# Patient Record
Sex: MALE | Race: WHITE | NOT HISPANIC OR LATINO | Employment: FULL TIME | URBAN - METROPOLITAN AREA
[De-identification: names, ages, dates, MRNs, and addresses within clinical notes are randomized per-mention and may not be internally consistent; named-entity substitution may affect disease eponyms.]

---

## 2017-01-20 ENCOUNTER — HOSPITAL ENCOUNTER (OUTPATIENT)
Dept: BEHAVIORAL HEALTH | Facility: MEDICAL CENTER | Age: 27
End: 2017-01-20
Attending: PSYCHIATRY & NEUROLOGY
Payer: OTHER GOVERNMENT

## 2017-01-20 DIAGNOSIS — F41.9 ANXIETY: ICD-10-CM

## 2017-01-20 DIAGNOSIS — F41.0 PANIC ATTACKS: ICD-10-CM

## 2017-01-20 RX ORDER — CLONAZEPAM 1 MG/1
1 TABLET ORAL
COMMUNITY
End: 2020-08-11

## 2017-01-20 RX ORDER — FLUOXETINE HYDROCHLORIDE 20 MG/1
40 CAPSULE ORAL DAILY
COMMUNITY
End: 2020-08-11

## 2017-01-20 RX ORDER — HYDROXYZINE HYDROCHLORIDE 50 MG/ML
50 INJECTION, SOLUTION INTRAMUSCULAR EVERY 6 HOURS PRN
COMMUNITY
End: 2017-11-28

## 2017-01-20 RX ORDER — CLONAZEPAM 1 MG/1
0.5 TABLET ORAL 2 TIMES DAILY
COMMUNITY
End: 2020-08-11

## 2017-01-20 RX ORDER — TRAZODONE HYDROCHLORIDE 100 MG/1
100 TABLET ORAL NIGHTLY
COMMUNITY
End: 2017-04-17 | Stop reason: SDUPTHER

## 2017-01-20 NOTE — BH THERAPY
[facility]  INITIAL ASSESSMENT    Name: Jerman Gilmore  MRN: [id number]  : 1990  Age: 26 y.o.  Date of assessment: 2017  PCP: Kwesi Lange M.D.  Persons in attendance: Patient    CHIEF COMPLAINT AND HISTORY OF PRESENTING PROBLEM:  (as stated by Patient):  Jerman Gilmore is a 26 y.o., White male referred for assessment by No ref. provider found.  Primary presenting issue includes 26 year old male seeking treatment for anxiety and panic attacks.  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  Having difficulty sleeping and having severe anxiety.  He was discharges from and IP facility after having S/I with a plan to shoot himself.  He has no access to firearms and Denies S/I at this time. We did discuss a safety plan and He is in agreement.  Chief Complaint   Patient presents with   • Panic Attack   • Anxiety   • Depression     with suicide ideation   .    FAMILY/SOCIAL HISTORY  Current living situation/household members: Lives with his parents   Relevant family history/structure/dynamics: Parents are supportive  Current family/social stressors:  medical evaluation feels that he has not been the same since his deployment.  Quality/quantity of current family and/or social support: parents are supportive.  Does patient/parent report a family history of behavioral health issues, diagnoses, or treatment? Yes  Family History   Problem Relation Age of Onset   • Cancer Neg Hx    • Diabetes Neg Hx    • Heart Disease Neg Hx    • Hypertension Neg Hx    • Hyperlipidemia Neg Hx    • Psychiatry Sister      insomnia   • Anxiety disorder Sister    • Psychiatry Maternal Grandfather      insomnia   • Depression Maternal Grandfather    • Anxiety disorder Maternal Grandfather    • Stroke Mother    • Thyroid Mother    • Depression Father    • Suicide Attempts Paternal Grandfather         BEHAVIORAL HEALTH TREATMENT HISTORY  Does patient/parent report a history of prior behavioral health  "treatment for patient? Yes:    Dates Level of Care Facilty/Provider Diagnosis/Problem Medications   12/2016 IP [facility] Panic/anx 32 days   5/2015 IP [facility] Panic/anx 5 days   2016 X2 IP [employer] Panic/anx 7 days/9 days   [facility]/2015 [facility]  \" \"   1 day   2015 IP  [place] \" \" 2 days   2016 OP Therapist/Psychiat  \" \"  one year                                     History of untreated behavioral health issues identified? Yes    MEDICAL HISTORY  Primary care behavioral health screenings: @PHQ@   Past medical/surgical history:   Past Medical History   Diagnosis Date   • Depression    • Insomnia    • Anxiety       Past Surgical History   Procedure Laterality Date   • Dental extraction(s)          Medication Allergies:  Review of patient's allergies indicates no known allergies.     Patient reports last physical exam: 12/2016  Does patient/parent report any history of or current developmental concerns? No  Does patient/parent report nutritional concerns? No  Does patient/parent report change in appetite or weight loss/gain? No  Does patient/parent report history of eating disorder symptoms? No  Does patient/parent report dental problem? No  Does patient/parent report physical pain? No   Indicate if pain is acute or chronic, and location: na   Pain scale rating: [unfilled]   Does patient/parent report functional impact of medical, developmental, or pain issues?   N\A    EDUCATIONAL  Is patient currently enrolled in a school/educational program?   No:   Highest grade level completed: AS criminal justice [employer]  School performance/functioning: good  History of Special Education/repeated grades/learning issues: no  Preferred learning style: doing  Current learning needs (large print, language barrier, etc):  na    EMPLOYMENT/RESOURCES  Is the patient currently employed? Yes  Does the patient/parent report adequate financial resources? Yes  Does patient identify impact of presenting issue on work functioning? Yes  Work " or income-related stressors:   evaluation     HISTORY:  Does patient report current or past enlistment? Yes    [If yes, trigger below section]  Does patient report history of exposure to combat? No  Does patient report history of  sexual trauma? No  Does patient report other -related stressors? Yes  Anxiety and panic from being deployed    SPIRITUAL/CULTURAL/IDENTITY:  What are the patient’s/family’s spiritual beliefs or practices? Believes in a Higher Power  What is the patient’s cultural or ethnic background/identity? caucasion  How does the patient identify their sexual orientation? Hetersexual  How does the patient identify their gender? males  Does the patient identify any spiritual/cultural/identity factors as relevant to the presenting issue? No    LEGAL HISTORY  Has the patient ever been involved with juvenile, adult, or family legal systems? No  MIP as an adolescent.   [If yes, trigger section below:]  Does patient report ever being a victim of a crime?  No  Does patient report involvement in any current legal issues?  No  Does patient report ever being arrested or committing a crime? No  Does patient report any current agency (parole/probation/CPS/) involvement? No    ABUSE/NEGLECT/TRAUMA SCREENING  Does patient report feeling “unsafe” in his/her home, or afraid of anyone? No  Does patient report any history of physical, sexual, or emotional abuse? No  Does parent or significant other report any of the above? No  Is there evidence of neglect by self? No  Is there evidence of neglect by a caregiver? No  Does the patient/parent report any history of CPS/APS/police involvement related to suspected abuse/neglect or domestic violence? No  Does the patient/parent report any other history of potentially traumatic life events? No  Based on the information provided during the current assessment, is a mandated report of suspected abuse/neglect being made?  No     SAFETY  ASSESSMENT - SELF  Does patient acknowledge current or past symptoms of dangerousness to self? Yes has had S/I in the past but no attempt.  Did hold a gun to his head at times.  Denies S/I at this time.  Does parent/significant other report patient has current or past symptoms of dangerousness to self? No      Recent change in frequency/specificity/intensity of suicidal thoughts or self-harm behavior? Yes  Denies S/I  Current access to firearms, medications, or other identified means of suicide/self-harm? No  If yes, willing to restrict access to means of suicide/self-harm? no access  Protective factors present: Future-oriented, Hopefulness, Optimism, Spiritual beliefs/practices and Strong family connections    Current Suicide Risk: Low  Crisis Safety Plan completed and copy given to patient: Yes    SAFETY ASSESSMENT - OTHERS  Does paor past symptoms of aggressive behavior or risk to others? No  Does parent/significant othtient acknowledge current or past symptoms of aggressive behavior or risk to others? No  Does parent/significant other report patient has current or past symptoms of aggressive behavior or risk to others? No    Recent change in frequency/specificity/intensity of thoughts or threats to harm others? No  Current access to firearms/other identified means of harm? No  If yes, willing to restrict access to weapons/means of harm? na  Protective factors present: Good frustration tolerance, Fear of consequences, Moral/spiritual prohibition, Positive impulse-control, Stable relationships and Stable employment    Current Homicide Risk:  Not applicable  Crisis Safety Plan completed and copy given to patient? No  Based on information provided during the current assessment, is a mandated “duty to warn” being exercised? No    SUBSTANCE USE/ADDICTION HISTORY  [] Not applicable - patient 10 years of age or younger    Is there a family history of substance use/addiction? No  Does patient acknowledge or  "parent/significant other report use of/dependence on substances? No  Last time patient used alcohol: 2015  Within the past week? No  Last time patient used marijuana: 2007  Within the past month? No  Any other street drugs ever tried even once? Yes  Any use of prescription medications/pills without a prescription, or for reasons others than originally prescribed?  No  Any other addictive behavior reported (gambling, shopping, sex)? No     Drug History:  Amphetamine:  Amphetamine frequency: Never used      Cannibis:  Cannabis frequency: Past occasional use  Cannabis last use: 1/20/07      Cocaine:  Cocaine frequency: Past rare use  Cocaine last use: 1/20/07      Ecstasy:  Ecstasy frequency: Past rare use  Ecstacy last use: 1/20/08  Cocaine last use: 1/20/07      Hallucinogen:  Hallucinogen frequency: Past rare use  Hallucinogen last use: 4/20/07      Inhalant:   Inhalant frequency: Never used      Opiate:  Opiate frequency: Never used  Cannabis frequency: Past occasional use  Cannabis last use: 1/20/07      Other:  Other drug frequency: Never used      Sedative:   Sedative frequency: Never used          What consequences does the patient associate with any of the above substance use and or addictive behaviors? None    Patient’s motivation/readiness for change: \" I just want to continue to get better and learn more coping skills to manage anxiety.    [] Patient denies use of any substance/addictive behaviors    STRENGTHS/ASSETS  Strengths Identified by interviewer: Insight into problems, Evidence of good judgement, Family suppport, Social support, Stable relationships and Optimism  Strengths Identified by patient: \" Physical fitness, Honest, kindhearted.    MENTAL STATUS/OBSERVATIONS   Participation: Verbally monopolizing, Attentive, Engaged and Open to feedback  Grooming: Casual and Neat  Orientation:Alert and Fully Oriented   Behavior: Tense  Eye contact: Limited   Mood:Depressed and Anxious  Affect:Full range, " Expansive and Anxious  Thought process: Logical and Circumstantial  Thought content:  Obsessions  The feeling of weakness because I have to ask for help.  Speech: Hypertalkative  Perception: Within normal limits  Memory: Recent:  Poor  Insight: Limited  Judgment:  Limited  Other:    Family/couple interaction observations: na    RESULTS OF SCREENING MEASURES:  [] Not applicable  Measure:   Score:     Measure:   Score:       CLINICAL FORMULATION: 26 year male seeking treatment for anxiety and panic.  Has been having difficulty with sleep since his deployment.  Recently spent 32 days in an  program in [place] for S/I with a plan to shoot himself but has not ever made an attempt.  Has expressed increased shame about being weak and feels he is judged by his co workers in the .  Agrees to start PHP on Tues 1/24/17.      DIAGNOSTIC IMPRESSION(S):  No diagnosis found.      IDENTIFIED NEEDS/PLAN:  [If any of these marked, trigger DISPOSITION list]  Mood/anxiety  Refer to [facility]: [facility]    Does patient express agreement with the above plan? Yes     Referral appointment(s) scheduled? Yes     Marianela Davis R.N.

## 2017-01-24 ENCOUNTER — HOSPITAL ENCOUNTER (OUTPATIENT)
Dept: BEHAVIORAL HEALTH | Facility: MEDICAL CENTER | Age: 27
End: 2017-01-24
Attending: PSYCHIATRY & NEUROLOGY
Payer: OTHER GOVERNMENT

## 2017-01-24 VITALS
SYSTOLIC BLOOD PRESSURE: 124 MMHG | WEIGHT: 160 LBS | HEART RATE: 80 BPM | TEMPERATURE: 98.1 F | DIASTOLIC BLOOD PRESSURE: 76 MMHG | BODY MASS INDEX: 25.11 KG/M2 | RESPIRATION RATE: 16 BRPM | HEIGHT: 67 IN

## 2017-01-24 DIAGNOSIS — F51.01 PRIMARY INSOMNIA: ICD-10-CM

## 2017-01-24 DIAGNOSIS — F41.1 GENERALIZED ANXIETY DISORDER: ICD-10-CM

## 2017-01-24 DIAGNOSIS — F33.1 MODERATE EPISODE OF RECURRENT MAJOR DEPRESSIVE DISORDER (HCC): ICD-10-CM

## 2017-01-24 PROCEDURE — G0410 GRP PSYCH PARTIAL HOSP 45-50: HCPCS

## 2017-01-24 PROCEDURE — 99215 OFFICE O/P EST HI 40 MIN: CPT | Mod: 25

## 2017-01-24 PROCEDURE — G0176 OPPS/PHP;ACTIVITY THERAPY: HCPCS

## 2017-01-24 PROCEDURE — 99204 OFFICE O/P NEW MOD 45 MIN: CPT | Performed by: PSYCHIATRY & NEUROLOGY

## 2017-01-24 PROCEDURE — G0177 OPPS/PHP; TRAIN & EDUC SERV: HCPCS

## 2017-01-24 NOTE — BH THERAPY
Group Therapy Checklist  Attendance: Attended  Attendance Duration (min): 46-60  Number of Participants: 4  Program/Group: Partial Hospital Program  Topics Covered: Grief & Loss  Participation: Active verbal participation, Attentive  Affect/Mood Range: Flexible, Normal range  Affect/Mood Display: Congruent w/content  Cognition: Oriented, Alert  Evidence of Imminent Suicide Risk: No  Evidence of imminent homicide risk: No  Therapeutic Interventions: Emotion clarification  Progress Toward Treatment Goal: Moderate improvement

## 2017-01-24 NOTE — BH THERAPY
"Group Therapy Checklist  Attendance: Attended  Attendance Duration (min): Greater than 60  Number of Participants: 4  Program/Group: Partial Hospital Program  Topics Covered: Other (Comment): (process group)  Participation: Active verbal participation, Attentive, Supportive to other group members, Open to feedback  Affect/Mood Range: Normal range, Flexible  Affect/Mood Display: Congruent w/content  Cognition: Alert, Oriented  Evidence of Imminent Suicide Risk: No  Evidence of imminent homicide risk: No  Therapeutic Interventions: Emotion clarification, Supportive psychotherapy  Progress Toward Treatment Goal: Mild improvement  Duncaneliazar introduced himself to group; he states he is here for insomnia, anxiety and depression. He processed the use of \"wise mind\" and how knowledge of that concept has helped him cope with his irrational thoughts. Receptive to peer welcome and support.   "

## 2017-01-24 NOTE — PROGRESS NOTES
BEHAVIORAL HEALTH  INITIAL PSYCHIATRIC EVALUATION    Name: Jerman Gilmore  MRN: [id number]  : 1990  Age: 26 y.o.  Date of assessment: 2017  PCP: Kwesi Lange M.D.  Persons in attendance:Patient  Total face-to-face time: 45 minutes.    CHIEF COMPLAINT AND HISTORY OF PRESENTING PROBLEM:   (As stated by Patient)  Jerman Gilmore is a 26 y.o., White male referred for assessment by No ref. provider found. Primary presenting issue includes   Chief Complaint   Patient presents with   • Anxiety     The patient is presented today with severe anxiety and depression.   .    HISTORY OF PRESENT ILLNESS:    This is a 25 yo male, single, never , , lives with parents, seen today for evaluations. The patient was admitted to [facility] in [place] for one moth and he was discharged on Clonazepam 1 mg at night, clonazepam 0.25 in AM, prozac 40 mg per day, trazodone 100 mg at night, and hydroxyzine 50 mg as needed. The patient was given ten days prescription and he was referred to [facility]. The patient had thoughts of taking his life using a gun. 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 The patient went through insomnia for five days. The patient was sad all the time, decreased motivation, low self esteem, difficulty to focus, hopeless and helpless feelings al l the time. The patient also developed increased worries all the time, anxiety attacks with palpitation, muscle tightness, difficulty to breath. The patient reported that his insomnia continued and he has trouble getting into sleep and staying asleep. The patient also felt out body experience. The patient reported that work situations, crowd, travelling, and shift change can trigger panic attacks. The patient denied symptoms of nate, hypomania, hallucinations, and delusions.     FAMILY/SOCIAL HISTORY:  Does patient/parent report a family history of  behavioral health issues, diagnoses, or treatment? Yes   The patients paternal grand father committed suicide. The patients father is a retired  and has depression. The patients sister has anxiety and depression and she is in [employer].  The patient was born and raised in [place], [place]. The patient denied any abuse and he had a good childhood. The patient did not fit in to school and he got in to drugs and alcohol. The patient finished high[employer]ool and joined .The patient never  and has no children.     Family History   Problem Relation Age of Onset   • Cancer Neg Hx    • Diabetes Neg Hx    • Heart Disease Neg Hx    • Hypertension Neg Hx    • Hyperlipidemia Neg Hx    • Psychiatry Sister      insomnia   • Anxiety disorder Sister    • Psychiatry Maternal Grandfather      insomnia   • Depression Maternal Grandfather    • Anxiety disorder Maternal Grandfather    • Stroke Mother    • Thyroid Mother    • Depression Father    • Suicide Attempts Paternal Grandfather        Current living situation/household members: lives with parents.  Relevant family history/structure/dynamics: good support.  Quality/quantity of current family and/or social support: good.   Social History     Social History   • Marital Status: Single     Spouse Name: N/A   • Number of Children: N/A   • Years of Education: N/A     Occupational History   • Not on file.     Social History Main Topics   • Smoking status: Former Smoker -- 1.00 packs/day for 2 years     Types: Cigarettes     Quit date: 03/20/2015   • Smokeless tobacco: Never Used   • Alcohol Use: No      Comment: binge drinking on weekends   • Drug Use: No      Comment: marajuana   • Sexual Activity: No     Other Topics Concern   • Not on file     Social History Narrative    Single.     [employer].        PSYCHIATRIC TREATMENT HISTORY:  Does patient/parent report a history of outpatient psychiatric treatment for patient?   Yes:    Dates Level of Care Facilty/Provider  Diagnosis/Problem Medications   2016 [facility] Panic and depression Prozac,trazodone, and clonazepam   2015 [facility]  same 5 days.    twice IP [facility] same Seven and nine days.    [facility] same One day.                                                    Does patient/parent report a history of psychiatric hospitalizations for patient?  Yes:    Dates Facilty/Provider Diagnosis/Problem Medications Length of Stay   As above.                                                                            Does patient/parent report a history of psychotherapy or other behavioral health treatment for patient?   Yes:     Dates Facilty/Provider Diagnosis/Problem Modaility Outcome                                                                               PAST MEDICAL HISTORY:         Past Medical History   Diagnosis Date   • Depression    • Insomnia    • Anxiety        Medication Allergies  Review of patient's allergies indicates no known allergies.    Medications (non psychiatric)  Current Outpatient Prescriptions   Medication Sig Dispense Refill   • trazodone (DESYREL) 100 MG Tab Take 100 mg by mouth every evening.     • clonazepam (KLONOPIN) 1 MG Tab Take 0.5 mg by mouth 2 times a day.     • clonazepam (KLONOPIN) 1 MG Tab Take 1 mg by mouth 1 time daily as needed (at night).     • fluoxetine (PROZAC) 20 MG Cap Take 40 mg by mouth every day.     • hydrOXYzine (VISTARIL) 50 MG/ML Solution 50 mg by Intramuscular route every 6 hours as needed.     • Cholecalciferol (VITAMIN D3) 2000 UNIT CAPS Take 1 Cap by mouth every day. 90 Cap 3   • mirtazapine (REMERON) 15 MG TABS Take 1 Tab by mouth every bedtime. 30 Tab 0   • temazepam (RESTORIL) 15 MG CAPS Take 1 Cap by mouth at bedtime as needed for Sleep. 30 Cap 0     No current facility-administered medications for this encounter.       DEVELOPMENTAL HISTORY:  Delivery:not asked.  Birth weight: not asked.  Prenatal complications:  No    complications:  No   complications:  No  In utero substance exposure:  No    Reached developmental milestones within normal limits?   Gross motor:  Yes  Fine motor:  Yes   Speech:  Yes   Social interaction:  Yes    EDUCATIONAL:  Is patient currently enrolled in a school/educational program?   No:   Highest grade level completed: High school.  School performance/functioning: average.  History of Special Education/repeated grades/learning issues: no  Preferred learning style: not asked.  Current learning needs (large print, language barrier, etc):  Not asked.    Psychiatric Review of Systems:   Mood: Variable mood  Anxiety: Frequent worry, Panic symptoms/attacks and Situational anxiety  Sleep: Initial insomnia, Middle insomnia and Terminal insomnia  Psychomotor/Energy: Decreased energy/activity level  Eating/Appetite: Decreased appetite  Cognitive: Impaired concentration - chronic  Behavior: Low/Decreased goal-directed activity   Psychosis: Other: none.  Social: Avoidance of social interactions, Poor/limited social skills, Fear of rejection and Social withdrawal/isolation       Other symptoms: none.    LEGAL HISTORY:  Has the patient ever been involved with juvenile, adult, or family legal systems? No    Does patient report ever committing a crime? No   Does patient report every being arrested? No  Does patient report ever being a victim of a crime? No  Does patient report involvement in any current legal issues?No  Does patient report any current agency (parole/probation/CPS/) involvement? No    ABUSE/NEGLECT SCREENING:  Does patient report feeling “unsafe” in his/her home, or afraid of anyone? No  Does patient report any history of physical, sexual, or emotional abuse? No  Does parent or significant other report any of the above? No  Is there evidence of neglect by self?No  Is there evidence of neglect by a caregiver? No  Does the patient/parent report any history of CPS/APS/police  "involvement related to suspected abuse/neglect or domestic violence? No    Based on the information provided during the current assessment, is a mandated report of suspected abuse/neglect being made?   No    SAFETY ASSESSMENT - SELF:  Does patient acknowledge current or past symptoms of dangerousness to self? The patient put a gun to his head.    Does parent/significant other report patient has current or past symptoms of dangerousness to self? No      History of suicide by family member: Yes Grand father committed suicide.  History of suicide by friend/significant other: No    Recent change in amount/specificity/intensity of suicidal thoughts or self-harm behavior?No  Current access to firearms, medications, or other identified means of suicide/self-harm? No  If yes, willing to restrict access to means of suicide/self-harm? No  Protective factors present: Fear of suicide    Current Suicide Risk: Low  Safety Plan completed, documented in chart, and copy given to patient: Yes     Crisis Safety Plan completed and copy given to patient: No     SAFETY ASSESSMENT - OTHERS:  Does patient acknowledge current or past symptoms of aggressive behavior or risk to others? No  Does parent/significant other report patient has current or past symptoms of aggressive behavior or risk to others? No      Recent change in amount/specificity/intensity of thoughts or threats to harm others? No  Current access to firearms/other identified means of harm? No  If yes, willing to restrict access to weapons/means of harm? No    Current Homicide Risk: Low  Crisis safety Plan completed, documented in chart, and copy given to patient? No    Based on information provided during the current assessment, is a mandated \"duty to warn\" being exercised? No    SUBSTANCE USE/ADDICTION HISTORY:  [] Not applicable - patient 10 years of age or younger    Is there a family history of substance use/addiction? Yes  Does patient acknowledge or parent/significant " "other report use of/dependence on substances? No  Last time patient used alcohol: 2015  Within the past week? No  Last time patient used marijuana: 2007.  Within the past month? No  Any other street drugs ever tried even once? No  Any use of prescription medications/pills without a prescription, or for reasons others than originally prescribed?  No  Any other addictive behavior reported (gambling, shopping, sex)? No    Drug History:  Amphetamine:  Amphetamine frequency: Never used      Cannibis:  Cannabis frequency: Past occasional use  Cannabis last use: 1/20/07      Cocaine:  Cocaine frequency: Past rare use  Cocaine last use: 1/20/07      Ecstasy:  Ecstasy frequency: Past rare use  Ecstacy last use: 1/20/08  Cocaine last use: 1/20/07      Hallucinogen:  Hallucinogen frequency: Past rare use  Hallucinogen last use: 4/20/07      Inhalant:   Inhalant frequency: Never used      Opiate:  Opiate frequency: Never used  Cannabis frequency: Past occasional use  Cannabis last use: 1/20/07      Other:  Other drug frequency: Never used      Sedative:   Sedative frequency: Never used        What consequences does the patient associate with any of the above substance use and or addictive behaviors?   None    Patient’s motivation/readiness for change: yes.    [] Patient denies use of any substance/addictive behaviors    STRENGTHS/ASSETS:  Strengths Identified by interviewer: Insight into problems, Self-awareness, Social support, Optimism and Cognitive flexibility  Strengths Identified by patient: willing to change.    MENTAL STATUS EXAM/OBSERVATIONS  /76 mmHg  Pulse 80  Temp(Src) 36.7 °C (98.1 °F)  Resp 16  Ht 1.7 m (5' 6.93\")  Wt 72.576 kg (160 lb)  BMI 25.11 kg/m2  Participation: Active verbal participation, Attentive and Engaged  Grooming:  Casual  Orientation: Alert and Fully Oriented   Eye contact:  Good  Behavior: Calm   Mood:  Anxious  Affect: Flexible and Full range  Thought process: Logical and " Goal-directed  Thought content: Within normal limits  Speech: Rate within normal limits and Volume within normal limits  Perception: Within normal limits  Memory:  No gross evidence of memory deficits  Insight:  Good  Judgment: Adequate  Other:   Family/couple interaction observations: NA.    RESULTS OF SCREENING MEASURES:  [] Not applicable  Measure:   Score:     Measure:   Score:        CLINICAL FORMULATION:   This is a 27 yo male, single, in , lives with parents, recently discharged from inpatient after verbalizing suicidal thoughts, seen for evaluation. There is a family history of depression and suicide. The patients symptoms started when he was deployed to [facility] in 2015. The patient presented with depression, severe anxiety, and insomnia.          DIAGNOSTIC IMPRESSION(S):  1. Moderate episode of recurrent major depressive disorder (CMS-HCC)    2. Generalized anxiety disorder    3. Primary insomnia         IDENTIFIED NEEDS/PLAN:  [If any of these marked, trigger DISPOSITION list]  Imminent safety risk - self, Mood/anxiety, Maladaptive behavior and Occupational/Educational. The patient is admitted to [facility] and restart prozac 40 mg per day, trazodone 100 mg at night, clonazepam 1 mg at night, and clonazepam 0.25 in AM. The patient was given prescriptions today. We requested MR form the previous hospital admission.    Defer    Does patient express agreement with the above plan? Yes    Referral appointment(s) scheduled? Yes    [x] More than 50% of face-to-face time was spent in counseling and coordinating care  Discussed:   Admit to PHP.    Vineet Riojas M.D.

## 2017-01-24 NOTE — BH THERAPY
[facility]  Therapy Progress Note    Patient Name: Jerman Gilmore  Patient MRN: [id number]  Today's Date: 1/24/2017     Type of session:Individual psychotherapy  Length of session: 50  Persons in attendance:Patient    Subjective/New Info: initial individual session. Jerman states he's been in a long term treatment center in [place] this past month. He states he is taking his medication as prescribed without adverse side effects. He denies SI/ HI/ AVH. He feels stable on this medication combination as evidenced by his anxiety going from 10/10 to 5/10; he states ideally he would like to be 3/10. He identifies his chief complaint as insomnia, then anxiety, followed by depression. We discussed his plan of care; milieu routine. He is currently living with his supportive parents; they are retired.      Objective/Observations:   Participation: Active verbal participation, Attentive, Engaged and Open to feedback   Grooming: Casual and Neat   Cognition: Alert and Fully Oriented   Eye contact: Good   Mood: Anxious   Affect: Flexible and Full range   Thought process: Logical and Goal-directed   Speech: Rate within normal limits and Volume within normal limits   Other:     Diagnoses: No diagnosis found.     Current risk:   SUICIDE: Low   Homicide: Not applicable   Self-harm: Not applicable   Relapse: Not applicable   Other:    Safety Plan reviewed? Not Indicated   If evidence of imminent risk is present, intervention/plan:     Therapeutic Intervention(s): Develop/modify treatment plan, Establish rapport, Interpersonal effectiveness skills and Supportive psychotherapy    Treatment Goal(s)/Objective(s) addressed: ineffective coping     Progress toward Treatment Goals: Return to baseline    Plan:  - Continue Partial Hospital Program  - Next appointment scheduled:  Visit date not found  - Patient is in agreement with the above plan:  YES    Gwen Sneed R.N.  1/24/2017

## 2017-01-24 NOTE — BH THERAPY
Group Therapy Checklist  Attendance: Attended  Attendance Duration (min): 46-60  Number of Participants: 3  Program/Group: Partial Hospital Program  Topics Covered: Sleep Hygiene  Participation: Active verbal participation, Attentive  Affect/Mood Range: Normal range, Flexible  Affect/Mood Display: Congruent w/content  Cognition: Alert, Oriented  Evidence of Imminent Suicide Risk: No  Evidence of imminent homicide risk: No  Therapeutic Interventions: Cognitive clarification, Self-care skills  Progress Toward Treatment Goal: Mild improvement

## 2017-01-24 NOTE — CARE PLAN
Problem: Ineffective Coping  Goal: Symptom reduction and improved functioning  Intervention: Partial Hospitalization Program  Day one, PHP  Intervention: Psychiatric Evaluation and Management  First appt today @7917  Intervention: Recognizing and Replacing Self Defeating Thoughts  Assigned to review next week  Intervention: Journaling as assigned  Jerman started a journal in [place]; daily entries continue.

## 2017-01-25 ENCOUNTER — HOSPITAL ENCOUNTER (OUTPATIENT)
Dept: BEHAVIORAL HEALTH | Facility: MEDICAL CENTER | Age: 27
End: 2017-01-25
Attending: PSYCHIATRY & NEUROLOGY
Payer: OTHER GOVERNMENT

## 2017-01-25 DIAGNOSIS — F41.1 GENERALIZED ANXIETY DISORDER: ICD-10-CM

## 2017-01-25 PROCEDURE — G0411 INTER ACTIVE GRP PSYCH PARTI: HCPCS

## 2017-01-25 PROCEDURE — G0177 OPPS/PHP; TRAIN & EDUC SERV: HCPCS

## 2017-01-25 PROCEDURE — G0176 OPPS/PHP;ACTIVITY THERAPY: HCPCS

## 2017-01-25 NOTE — BH THERAPY
Group Therapy Checklist  Attendance: Attended  Attendance Duration (min): 46-60  Number of Participants: 3  Program/Group: Partial Hospital Program  Topics Covered: Other (Comment): (creative art)  Participation: Active verbal participation  Affect/Mood Range: Normal range, Flexible  Affect/Mood Display: Congruent w/content  Cognition: Alert, Oriented  Evidence of Imminent Suicide Risk: No  Evidence of imminent homicide risk: No  Therapeutic Interventions: Socialization  Progress Toward Treatment Goal: Mild improvement

## 2017-01-25 NOTE — BH THERAPY
Patient actively participated in process group.  Addressed active unresolved emotional issues. Taught some emotional skills and techniques to assist with the emotional skill building that relates to their presenting issues and active treatment plan.

## 2017-01-25 NOTE — BH THERAPY
Group Therapy Checklist  Attendance: Attended  Attendance Duration (min): 46-60  Number of Participants: 11  Program/Group: Partial Hospital Program  Topics Covered: Cognitive distortions  Participation: Active verbal participation  Affect/Mood Range: Normal range, Flexible  Affect/Mood Display: Congruent w/content  Cognition: Alert, Oriented  Evidence of Imminent Suicide Risk: No  Evidence of imminent homicide risk: No  Therapeutic Interventions: Cognitive clarification  Progress Toward Treatment Goal: Mild improvement

## 2017-01-25 NOTE — BH THERAPY
Group Therapy Checklist  Attendance: Attended  Attendance Duration (min): 46-60  Number of Participants: 3  Program/Group: Partial Hospital Program  Topics Covered: Pain vs. Suffering  Participation: Active verbal participation  Affect/Mood Range: Normal range, Flexible  Affect/Mood Display: Congruent w/content  Cognition: Alert, Oriented  Evidence of Imminent Suicide Risk: No  Evidence of imminent homicide risk: No  Therapeutic Interventions: Cognitive clarification  Progress Toward Treatment Goal: Mild improvement

## 2017-01-26 ENCOUNTER — HOSPITAL ENCOUNTER (OUTPATIENT)
Dept: BEHAVIORAL HEALTH | Facility: MEDICAL CENTER | Age: 27
End: 2017-01-26
Attending: PSYCHIATRY & NEUROLOGY
Payer: OTHER GOVERNMENT

## 2017-01-26 DIAGNOSIS — F41.0 PANIC DISORDER WITHOUT AGORAPHOBIA: ICD-10-CM

## 2017-01-26 PROCEDURE — G0176 OPPS/PHP;ACTIVITY THERAPY: HCPCS

## 2017-01-26 PROCEDURE — G0410 GRP PSYCH PARTIAL HOSP 45-50: HCPCS

## 2017-01-26 PROCEDURE — G0177 OPPS/PHP; TRAIN & EDUC SERV: HCPCS

## 2017-01-26 NOTE — BH THERAPY
Group Therapy Checklist  Attendance: Attended  Attendance Duration (min): Greater than 60  Number of Participants: 3  Program/Group: Partial Hospital Program  Topics Covered: Other (Comment): (The Shift: 2 hr)  Participation: Active verbal participation  Affect/Mood Range: Normal range, Flexible  Affect/Mood Display: Congruent w/content  Cognition: Alert, Oriented  Evidence of Imminent Suicide Risk: No  Evidence of imminent homicide risk: No  Therapeutic Interventions: Emotion clarification  Progress Toward Treatment Goal: Moderate improvement

## 2017-01-26 NOTE — BH THERAPY
Group Therapy Checklist  Attendance: Attended  Attendance Duration (min): Greater than 60  Number of Participants: 7  Program/Group: Partial Hospital Program  Topics Covered: Other (Comment): (process group)  Participation: Active verbal participation, Attentive, Supportive to other group members, Open to feedback  Affect/Mood Range: Normal range, Flexible  Affect/Mood Display: Congruent w/content  Cognition: Alert, Oriented  Evidence of Imminent Suicide Risk: No  Evidence of imminent homicide risk: No  Therapeutic Interventions: Emotion clarification, Supportive psychotherapy  Progress Toward Treatment Goal: Mild improvement  Jerman processed the improvement in his moods since he has begun therapy two months ago. He feels his anxiety is half of what it once was. Receptive to group support.

## 2017-01-26 NOTE — BH THERAPY
Group Therapy Checklist  Attendance: Attended  Attendance Duration (min): 46-60  Number of Participants: 9  Program/Group: Intensive Outpatient Program  Topics Covered: Spirituality  Participation: Active verbal participation, Verbally monopolizing  Affect/Mood Range: Normal range, Flexible  Affect/Mood Display: Congruent w/content  Cognition: Alert, Oriented  Evidence of Imminent Suicide Risk: No  Evidence of imminent homicide risk: No  Therapeutic Interventions: Self-care skills  Progress Toward Treatment Goal: Moderate improvement

## 2017-01-27 ENCOUNTER — HOSPITAL ENCOUNTER (OUTPATIENT)
Dept: BEHAVIORAL HEALTH | Facility: MEDICAL CENTER | Age: 27
End: 2017-01-27
Attending: PSYCHIATRY & NEUROLOGY
Payer: OTHER GOVERNMENT

## 2017-01-27 ENCOUNTER — TELEPHONE (OUTPATIENT)
Dept: BEHAVIORAL HEALTH | Facility: MEDICAL CENTER | Age: 27
End: 2017-01-27

## 2017-01-27 VITALS
BODY MASS INDEX: 25.11 KG/M2 | WEIGHT: 160 LBS | HEART RATE: 78 BPM | HEIGHT: 67 IN | SYSTOLIC BLOOD PRESSURE: 122 MMHG | RESPIRATION RATE: 16 BRPM | TEMPERATURE: 98.2 F | DIASTOLIC BLOOD PRESSURE: 76 MMHG

## 2017-01-27 DIAGNOSIS — F33.1 MODERATE EPISODE OF RECURRENT MAJOR DEPRESSIVE DISORDER (HCC): ICD-10-CM

## 2017-01-27 DIAGNOSIS — F51.01 PRIMARY INSOMNIA: ICD-10-CM

## 2017-01-27 DIAGNOSIS — F41.1 GENERALIZED ANXIETY DISORDER: ICD-10-CM

## 2017-01-27 PROCEDURE — G0176 OPPS/PHP;ACTIVITY THERAPY: HCPCS

## 2017-01-27 PROCEDURE — G0410 GRP PSYCH PARTIAL HOSP 45-50: HCPCS

## 2017-01-27 PROCEDURE — 99215 OFFICE O/P EST HI 40 MIN: CPT | Mod: 25

## 2017-01-27 PROCEDURE — 99214 OFFICE O/P EST MOD 30 MIN: CPT | Performed by: PSYCHIATRY & NEUROLOGY

## 2017-01-27 PROCEDURE — G0177 OPPS/PHP; TRAIN & EDUC SERV: HCPCS

## 2017-01-27 NOTE — BH THERAPY
Group Therapy Checklist  Attendance: Attended  Attendance Duration (min): 46-60  Number of Participants: 2  Program/Group: Partial Hospital Program  Topics Covered: Other (Comment): (creative art)  Participation: Active verbal participation  Affect/Mood Range: Normal range, Flexible  Affect/Mood Display: Congruent w/content  Cognition: Alert, Oriented  Evidence of Imminent Suicide Risk: No  Evidence of imminent homicide risk: No  Therapeutic Interventions: Socialization, Leisure and Recreation skills  Progress Toward Treatment Goal: Moderate improvement

## 2017-01-27 NOTE — TELEPHONE ENCOUNTER
[facility]    Treatment Team Staffing    Patient Name: Jerman Gilmore Program: PHP Date: 1/27/2017     Attendees: Marianela Davis, BRANT; Gwen Sneed, RN and PANDA Riojas MD      Patient's Progress toward Goals Listed on the Treatment Plan: adjusting to PHP routine; routine identified as helpful to reduce anxiety    1. Client's Participation When in Attendance Was: Active in a Positive Way    2. Counselor's Evaluation of Client's Progress: Positive Movement    3. Patient is attending group and individual sessions and is progressing well toward the treatment goals: yes      YES NO   A. Relapse During Program []  [x]    B. Requires physician review []  [x]    C. Referral to program inappropriate []  [x]    D. Non compliance with Treatment Plan []  [x]    E. Early treatment termination (lack of attendance) []  [x]      []      Comments: feeling sad today, girl he has been texting did not respond so no Saturday night date.     Treatment Plan Review: - Continue [facility] Program  - Next appointment scheduled:  1/30/2017  - Patient is in agreement with the above plan:  YES

## 2017-01-27 NOTE — PROGRESS NOTES
"[facility]  PSYCHIATRIC FOLLOW-UP NOTE    Name: Jerman Gilmore  MRN: [id number]  : 1990  Age: 26 y.o.  Date of assessment: 2017  PCP: Kwesi Lange M.D.  Persons in attendance: Patient    REASON FOR VISIT/CHIEF COMPLAINT (as stated by Patient):  Jerman Gilmore is a 26 y.o., White male, attending follow-up appointment for   Chief Complaint   Patient presents with   • Other     Depression, anxiety, and insomnia with suicidall thoughts   .      HISTORY OF PRESENT ILLNESS:    The patient is seen today for follow up on his mood disorder. The patient reported that his sleep improved on clonazepam and he is taking trazodone with that. The patient talked about his guilt that he could not continue with . The patient felt like he let his family down. The patients dad is retired ..     PSYCHOSOCIAL CHANGES SINCE PREVIOUS CONTACT:  Sleep improved and mood is five in O to10 scale.    RESPONSE TO TREATMENT:  Fair.    MEDICATION SIDE EFFECTS:  Denied.    REVIEW OF SYSTEMS:        Constitutional negative   Eyes negative   Ears/Nose/Mouth/Throat negative   Cardiovascular negative   Respiratory negative   Gastrointestinal negative   Genitourinary negative   Muscular negative   Integumentary negative   Neurological negative   Endocrine negative   Hematologic/Lymphatic negative       PSYCHIATRIC EXAMINATION/MENTAL STATUS  /76 mmHg  Pulse 78  Temp(Src) 36.8 °C (98.2 °F)  Resp 16  Ht 1.7 m (5' 6.93\")  Wt 72.576 kg (160 lb)  BMI 25.11 kg/m2  Participation: Active verbal participation, Attentive and Engaged  Grooming:Casual  Orientation: Alert and Fully Oriented  Eye contact: Good  Behavior:Calm   Mood: Depressed and Anxious  Affect: Flexible and Full range  Thought process: Logical and Goal-directed  Thought content:  Within normal limits  Speech: Rate within normal limits and Volume within normal limits  Perception:  Within normal limits  Memory:  No gross evidence of memory " deficits  Insight: Good  Judgment: Adequate  Family/couple interaction observations:   Other:    Current risk:    Suicide: Low   Homicide: Low   Self-harm: Low  Relapse: Low  Other:   Crisis Safety Plan reviewed?Yes  If evidence of imminent risk is present, intervention/plan:    Medical Records/Labs/Diagnostic Tests Reviewed: yes.    Medical Records/Labs/Diagnostic Tests Ordered: none.    DIAGNOSTIC IMPRESSION(S):  1. Generalized anxiety disorder    2. Moderate episode of recurrent major depressive disorder (CMS-HCC)    3. Primary insomnia           ASSESSMENT AND PLAN:  Continue   Fluoxetine 40 mg per day  Trazodone 100 mg at night  Clonazepam 1 mg at night and clonazepam 0.25 mg in AM  Follow up in PHP.    More than 50% of face-to-face time in this 30 minute visit was spent in psychotherapy/counseling.    Topics addressed include:    Vineet Riojas M.D.

## 2017-01-27 NOTE — BH THERAPY
" [facility]  Therapy Progress Note    Patient Name: Jerman Gilmore  Patient MRN: [id number]  Today's Date: 2017     Type of session:Individual psychotherapy  Length of session: 45  Persons in attendance:Patient    Subjective/New Info: individual session. Processed leisure and spiritual assessments. Jerman has indicated that codependency is often a problem for him. 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 His  grandfather  recently and Jerman feels he let him down. He longs for connection with a significant other and connection with a career passion.     Objective/Observations:   Participation: Active verbal participation, Attentive, Engaged and Open to feedback   Grooming: Casual and Neat   Cognition: Alert and Fully Oriented   Eye contact: Good   Mood: Anxious   Affect: Flexible and Full range   Thought process: Logical and Goal-directed   Speech: Rate within normal limits and Volume within normal limits   Other:     Diagnoses: No diagnosis found.     Current risk:   SUICIDE: Low   Homicide: Not applicable   Self-harm: Not applicable   Relapse: Not applicable   Other:    Safety Plan reviewed? Not Indicated   If evidence of imminent risk is present, intervention/plan:     Therapeutic Intervention(s): Distress tolerance skills, Interpersonal effectiveness skills, Leisure and recreation skills, Review treatment plan and Supportive psychotherapy    Treatment Goal(s)/Objective(s) addressed: ineffective coping     Progress toward Treatment Goals: Mild improvement    Plan:  - Continue Partial Hospital Program  - \"Homework\" recommendation: journal three things to be grateful for each evening  - Next appointment scheduled:  2017  - Patient is in agreement with the above plan:  YES    Gwen Sneed R.N.  2017                                     "

## 2017-01-27 NOTE — BH THERAPY
Group Therapy Checklist  Attendance: Attended  Attendance Duration (min): 46-60  Number of Participants: 2  Program/Group: Partial Hospital Program  Topics Covered: Other (Comment): (Flow: the neuroscience of mindfulness)  Participation: Active verbal participation  Affect/Mood Range: Normal range, Flexible  Affect/Mood Display: Congruent w/content  Cognition: Alert, Oriented  Evidence of Imminent Suicide Risk: No  Evidence of imminent homicide risk: No  Therapeutic Interventions: Cognitive clarification, Distress tolerance skills  Progress Toward Treatment Goal: Moderate improvement

## 2017-01-27 NOTE — BH THERAPY
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  Expressed some sadness of letting go of a dream or idea of who he wanted to be.  Receptive to support from peers.)  Affect/Mood Range: Flexible, Normal range  Affect/Mood Display: Congruent w/content  Evidence of Imminent Suicide Risk: No  Evidence of imminent homicide risk: No  Therapeutic Interventions: Emotion clarification  Progress Toward Treatment Goal: Moderate improvement

## 2017-01-27 NOTE — BH THERAPY
Group Therapy Checklist  Attendance: Attended  Attendance Duration (min): 46-60  Number of Participants: 7  Program/Group: Partial Hospital Program  Topics Covered: ACT conept intro  Participation: Active verbal participation, Attentive  Affect/Mood Range: Normal range, Flexible  Affect/Mood Display: Congruent w/content  Cognition: Alert, Oriented  Evidence of Imminent Suicide Risk: No  Evidence of imminent homicide risk: No  Therapeutic Interventions: Cognitive clarification, Values clarification, Mindfulness exercise  Progress Toward Treatment Goal: Mild improvement

## 2017-01-30 ENCOUNTER — HOSPITAL ENCOUNTER (OUTPATIENT)
Dept: BEHAVIORAL HEALTH | Facility: MEDICAL CENTER | Age: 27
End: 2017-01-30
Attending: PSYCHIATRY & NEUROLOGY
Payer: OTHER GOVERNMENT

## 2017-01-30 DIAGNOSIS — F43.10 POSTTRAUMATIC STRESS DISORDER: ICD-10-CM

## 2017-01-30 PROCEDURE — G0176 OPPS/PHP;ACTIVITY THERAPY: HCPCS

## 2017-01-30 PROCEDURE — G0177 OPPS/PHP; TRAIN & EDUC SERV: HCPCS

## 2017-01-30 PROCEDURE — G0411 INTER ACTIVE GRP PSYCH PARTI: HCPCS

## 2017-01-30 NOTE — BH THERAPY
Group Therapy Checklist  Attendance: Attended  Attendance Duration (min): 46-60  Number of Participants: 3  Program/Group: Partial Hospital Program  Topics Covered: [place] kindness  Participation: Active verbal participation, Supportive to other group members, Attentive, Open to feedback  Affect/Mood Range: Normal range, Flexible  Affect/Mood Display: Congruent w/content  Cognition: Alert, Oriented  Evidence of Imminent Suicide Risk: No  Evidence of imminent homicide risk: No  Therapeutic Interventions: Emotion clarification, Self-care skills, Mindfulness exercise  Progress Toward Treatment Goal: Moderate improvement.  He shared some of the skills he has learned in treatment and was supportive of other group members.

## 2017-01-30 NOTE — BH THERAPY
Group Therapy Checklist  Attendance: Attended  Attendance Duration (min): 46-60  Number of Participants: 9  Program/Group: Partial Hospital Program  Topics Covered: Assertiveness  Participation: Active verbal participation  Affect/Mood Range: Normal range, Flexible  Affect/Mood Display: Congruent w/content  Cognition: Alert, Oriented  Evidence of Imminent Suicide Risk: No  Evidence of imminent homicide risk: No  Therapeutic Interventions: Communication skills, Cognitive clarification  Progress Toward Treatment Goal: Mild improvement

## 2017-01-31 ENCOUNTER — HOSPITAL ENCOUNTER (OUTPATIENT)
Dept: BEHAVIORAL HEALTH | Facility: MEDICAL CENTER | Age: 27
End: 2017-01-31
Attending: PSYCHIATRY & NEUROLOGY
Payer: OTHER GOVERNMENT

## 2017-01-31 VITALS
SYSTOLIC BLOOD PRESSURE: 126 MMHG | RESPIRATION RATE: 14 BRPM | DIASTOLIC BLOOD PRESSURE: 80 MMHG | TEMPERATURE: 98.4 F | BODY MASS INDEX: 25.11 KG/M2 | HEART RATE: 88 BPM | WEIGHT: 160 LBS

## 2017-01-31 DIAGNOSIS — F41.0 PANIC DISORDER WITHOUT AGORAPHOBIA WITH SEVERE PANIC ATTACKS: ICD-10-CM

## 2017-01-31 DIAGNOSIS — F33.1 MODERATE EPISODE OF RECURRENT MAJOR DEPRESSIVE DISORDER (HCC): ICD-10-CM

## 2017-01-31 PROCEDURE — G0410 GRP PSYCH PARTIAL HOSP 45-50: HCPCS

## 2017-01-31 PROCEDURE — G0177 OPPS/PHP; TRAIN & EDUC SERV: HCPCS

## 2017-01-31 PROCEDURE — 99213 OFFICE O/P EST LOW 20 MIN: CPT | Performed by: PSYCHIATRY & NEUROLOGY

## 2017-01-31 PROCEDURE — G0176 OPPS/PHP;ACTIVITY THERAPY: HCPCS

## 2017-01-31 PROCEDURE — 99215 OFFICE O/P EST HI 40 MIN: CPT | Mod: 25

## 2017-01-31 NOTE — BH THERAPY
Group Therapy Checklist  Attendance: Attended  Attendance Duration (min): 46-60  Number of Participants: 5  Program/Group: Partial Hospital Program  Topics Covered: Detachment  Participation: Active verbal participation, Verbally monopolizing, Attentive  Affect/Mood Range: Normal range, Flexible  Affect/Mood Display: Congruent w/content  Cognition: Alert, Oriented  Evidence of Imminent Suicide Risk: No  Evidence of imminent homicide risk: No  Therapeutic Interventions: Emotion clarification  Progress Toward Treatment Goal: Moderate improvement

## 2017-01-31 NOTE — BH THERAPY
[facility]  Therapy Progress Note    Patient Name: Jerman Gilmore  Patient MRN: [id number]  Today's Date: 1/31/2017     Type of session:Individual psychotherapy  Length of session: 45  Persons in attendance:Patient    Subjective/New Info: individual session. Message for Chief Pulido at [phone] re: treatment plan (GUILHERME in chart). Processed fears regarding returning to work and how work contacting him triggered increase in anxiety. Discussed strategies for self care, grounding especially using socratic feedback.     Objective/Observations:   Participation: Active verbal participation, Attentive, Engaged and Open to feedback   Grooming: Casual and Neat   Cognition: Alert and Fully Oriented   Eye contact: Limited   Mood: Depressed and Anxious   Affect: Flexible and Full range   Thought process: Logical and Goal-directed   Speech: Rate within normal limits and Volume within normal limits   Other:     Diagnoses:   1. Panic disorder without agoraphobia with severe panic attacks         Current risk:   SUICIDE: Low   Homicide: Not applicable   Self-harm: Not applicable   Relapse: Not applicable   Other:    Safety Plan reviewed? Not Indicated   If evidence of imminent risk is present, intervention/plan:     Therapeutic Intervention(s): Distress tolerance skills, Interpersonal effectiveness skills, Review treatment plan, Self-care skills and Supportive psychotherapy    Treatment Goal(s)/Objective(s) addressed: ineffective coping     Progress toward Treatment Goals: Mild improvement    Plan:  - Continue Partial Hospital Program  - Patient is in agreement with the above plan:  YES    Gwen Sneed R.N.  1/31/2017

## 2017-01-31 NOTE — CARE PLAN
Problem: Ineffective Coping  Goal: Symptom reduction and improved functioning  Intervention: Relapse Prevention Plan  PC to Chief Pulido with f/u email [email or web address] re: treatment plan   Will talk to Chief Pulido in one month.   Intervention: Journaling as assigned  Processed recent journal entries including art work - three headed snake: anxiety, depression and insomnia

## 2017-01-31 NOTE — PROGRESS NOTES
[facility]  PSYCHIATRIC FOLLOW-UP NOTE    Name: Jerman Gilmore  MRN: [id number]  : 1990  Age: 26 y.o.  Date of assessment: 2017  PCP: Kwesi Lange M.D.  Persons in attendance: Patient    REASON FOR VISIT/CHIEF COMPLAINT (as stated by Patient):  Jerman Gilmore is a 26 y.o., White male, attending follow-up appointment for   Chief Complaint   Patient presents with   • Anxiety     The patient is seen today for follow up on his depression and anxiety.   .      HISTORY OF PRESENT ILLNESS:    The patient is seen today for follow up and he is very anxious today because he had a call from his chief. The patients thoughts of going back there made him very nervous and anxious. The patient is taking his medications as prescribed. The patient reported that he woke four times last night but was able to go back to sleep.    PSYCHOSOCIAL CHANGES SINCE PREVIOUS CONTACT:  Anxious and nervous today.     RESPONSE TO TREATMENT:  Fair.    MEDICATION SIDE EFFECTS:  Denied.    REVIEW OF SYSTEMS:        Constitutional negative   Eyes negative   Ears/Nose/Mouth/Throat negative   Cardiovascular negative   Respiratory negative   Gastrointestinal negative   Genitourinary negative   Muscular negative   Integumentary negative   Neurological negative   Endocrine negative   Hematologic/Lymphatic negative       PSYCHIATRIC EXAMINATION/MENTAL STATUS  /80 mmHg  Pulse 88  Temp(Src) 36.9 °C (98.4 °F)  Resp 14  Wt 72.576 kg (160 lb)  Participation: Active verbal participation and Attentive  Grooming:Casual  Orientation: Alert and Fully Oriented  Eye contact: Limited  Behavior:Tense and Hypoactive   Mood: Anxious  Affect: Blunted  Thought process: Logical and Goal-directed  Thought content:  Within normal limits  Speech: Rate within normal limits and Volume within normal limits  Perception:  Within normal limits  Memory:  No gross evidence of memory deficits  Insight: Good  Judgment: Good  Family/couple interaction  observations:   Other:    Current risk:    Suicide: Low   Homicide: Low   Self-harm: Low  Relapse: Low  Other:   Crisis Safety Plan reviewed?Yes  If evidence of imminent risk is present, intervention/plan:    Medical Records/Labs/Diagnostic Tests Reviewed: yes.    Medical Records/Labs/Diagnostic Tests Ordered: none.    DIAGNOSTIC IMPRESSION(S):  1. Panic disorder without agoraphobia with severe panic attacks    2. Moderate episode of recurrent major depressive disorder (CMS-[facility])           ASSESSMENT AND PLAN:  Major depression  Panic disorder  Situational anxiety    Continue  Prozac 40 mg per day  Trazodone 100 mg at night  Clonazepam 1 mg at night  Clonazepam 0.25 mg in AM  Follow up in PHP.      More than 50% of face-to-face time in this 30 minute visit was spent in psychotherapy/counseling.    Topics addressed include:  Continue PHP  And other activities.    Vineet Riojas M.D.

## 2017-01-31 NOTE — BH THERAPY
Group Therapy Checklist  Attendance: Attended  Attendance Duration (min): Greater than 60  Number of Participants: 2  Program/Group: Partial Hospital Program  Topics Covered: Other (Comment): (the Shift: 2 hours)  Participation: Attentive  Affect/Mood Range: Normal range, Flexible  Affect/Mood Display: Congruent w/content  Cognition: Alert, Oriented  Evidence of Imminent Suicide Risk: No  Evidence of imminent homicide risk: No  Therapeutic Interventions: Values clarification, Mindfulness exercise  Progress Toward Treatment Goal: Mild improvement

## 2017-01-31 NOTE — BH THERAPY
Group Therapy Checklist  Attendance: Attended  Attendance Duration (min): Greater than 60  Number of Participants: 5  Program/Group: Partial Hospital Program  Topics Covered: Other (Comment): (process group)  Participation: Active verbal participation, Attentive, Supportive to other group members, Open to feedback  Affect/Mood Range: Normal range, Flexible  Affect/Mood Display: Congruent w/content  Cognition: Alert, Oriented  Evidence of Imminent Suicide Risk: No  Evidence of imminent homicide risk: No  Therapeutic Interventions: Emotion clarification, Supportive psychotherapy  Progress Toward Treatment Goal: Mild improvement  Jerman processed his fears regarding going by his work place today. He explored the best and worst thing that could happen. He hasn't seen co-workers in a couple of months and although he intellectually knows it will be okay, he is feeling the anxiety of the unknown. Receptive to peer support.

## 2017-02-01 ENCOUNTER — HOSPITAL ENCOUNTER (OUTPATIENT)
Dept: BEHAVIORAL HEALTH | Facility: MEDICAL CENTER | Age: 27
End: 2017-02-01
Attending: PSYCHIATRY & NEUROLOGY
Payer: OTHER GOVERNMENT

## 2017-02-01 DIAGNOSIS — F41.0 PANIC DISORDER WITHOUT AGORAPHOBIA WITH MODERATE PANIC ATTACKS: ICD-10-CM

## 2017-02-01 PROCEDURE — G0177 OPPS/PHP; TRAIN & EDUC SERV: HCPCS

## 2017-02-01 PROCEDURE — G0410 GRP PSYCH PARTIAL HOSP 45-50: HCPCS

## 2017-02-01 PROCEDURE — G0176 OPPS/PHP;ACTIVITY THERAPY: HCPCS

## 2017-02-01 NOTE — BH THERAPY
"Group Therapy Checklist  Attendance: Attended  Attendance Duration (min): Greater than 60  Number of Participants: 10  Program/Group: Partial Hospital Program  Topics Covered: Other (Comment): (process group)  Participation: Active verbal participation, Attentive, Supportive to other group members, Open to feedback  Affect/Mood Range: Normal range, Flexible  Affect/Mood Display: Congruent w/content  Cognition: Alert, Oriented  Evidence of Imminent Suicide Risk: No  Evidence of imminent homicide risk: No  Therapeutic Interventions: Emotion clarification, Supportive psychotherapy  Progress Toward Treatment Goal: Moderate improvement  Jerman processed his visit to his workplace yesterday, describing the overwhelming anxiety and his use of therapy tools to walk through the discomfort rather than \"take a pill\". He felt successful and encouraged by his co-workers who he felt were supportive and glad to see him. Receptive to peer support.   "

## 2017-02-01 NOTE — BH THERAPY
Group Therapy Checklist  Attendance: Attended  Attendance Duration (min): 46-60  Number of Participants: 10  Program/Group: Partial Hospital Program  Topics Covered: Chalk talk  Participation: Attentive  Affect/Mood Range: Normal range, Flexible  Affect/Mood Display: Congruent w/content  Cognition: Alert, Oriented  Evidence of Imminent Suicide Risk: No  Evidence of imminent homicide risk: No  Therapeutic Interventions: Relapse prevention  Progress Toward Treatment Goal: Mild improvement

## 2017-02-01 NOTE — BH THERAPY
Group Therapy Checklist  Attendance: Attended  Attendance Duration (min): 46-60  Number of Participants: 3  Program/Group: Partial Hospital Program  Topics Covered: Other (Comment): (interactive: playing basketball)  Participation: Active verbal participation  Affect/Mood Range: Normal range, Flexible  Affect/Mood Display: Congruent w/content  Cognition: Alert, Oriented  Evidence of Imminent Suicide Risk: No  Evidence of imminent homicide risk: No  Therapeutic Interventions: Leisure and Recreation skills, Play therapy, Socialization  Progress Toward Treatment Goal: Moderate improvement

## 2017-02-02 ENCOUNTER — HOSPITAL ENCOUNTER (OUTPATIENT)
Dept: BEHAVIORAL HEALTH | Facility: MEDICAL CENTER | Age: 27
End: 2017-02-02
Attending: PSYCHIATRY & NEUROLOGY
Payer: OTHER GOVERNMENT

## 2017-02-02 DIAGNOSIS — F41.0 PANIC DISORDER WITHOUT AGORAPHOBIA WITH MODERATE PANIC ATTACKS: ICD-10-CM

## 2017-02-02 PROCEDURE — G0176 OPPS/PHP;ACTIVITY THERAPY: HCPCS

## 2017-02-02 PROCEDURE — G0411 INTER ACTIVE GRP PSYCH PARTI: HCPCS

## 2017-02-02 PROCEDURE — G0177 OPPS/PHP; TRAIN & EDUC SERV: HCPCS

## 2017-02-02 NOTE — BH THERAPY
Group Therapy Checklist  Attendance: Attended  Attendance Duration (min): 46-60  Number of Participants: 3  Program/Group: Partial Hospital Program  Topics Covered: Mindful practice  Participation: Active verbal participation, Attentive  Affect/Mood Range: Normal range, Flexible  Affect/Mood Display: Congruent w/content  Cognition: Alert, Oriented  Evidence of Imminent Suicide Risk: No  Evidence of imminent homicide risk: No  Therapeutic Interventions: Mindfulness exercise  Progress Toward Treatment Goal: Moderate improvement

## 2017-02-03 ENCOUNTER — HOSPITAL ENCOUNTER (OUTPATIENT)
Dept: BEHAVIORAL HEALTH | Facility: MEDICAL CENTER | Age: 27
End: 2017-02-03
Attending: PSYCHIATRY & NEUROLOGY
Payer: OTHER GOVERNMENT

## 2017-02-03 DIAGNOSIS — F41.0 PANIC DISORDER WITHOUT AGORAPHOBIA: ICD-10-CM

## 2017-02-03 PROCEDURE — G0176 OPPS/PHP;ACTIVITY THERAPY: HCPCS

## 2017-02-03 PROCEDURE — G0410 GRP PSYCH PARTIAL HOSP 45-50: HCPCS

## 2017-02-03 PROCEDURE — G0177 OPPS/PHP; TRAIN & EDUC SERV: HCPCS

## 2017-02-03 NOTE — BH THERAPY
Group Therapy Checklist  Attendance: Attended  Attendance Duration (min): Greater than 60  Number of Participants: 8  Program/Group: Partial Hospital Program  Topics Covered: Other (Comment): (Process Group)  Participation: Active verbal participation, Supportive to other group members, Attentive (Talked about benefit of medication for sleep; irritated about phone.) Aware he can choose how much power to give external events.  Affect/Mood Range: Normal range, Flexible  Affect/Mood Display: Congruent w/content, Other (Comment) (Irritated re: external events)  Cognition: Alert, Oriented  Evidence of Imminent Suicide Risk: No  Evidence of imminent homicide risk: No  Therapeutic Interventions: Cognitive modification, Emotion clarification, Supportive psychotherapy  Progress Toward Treatment Goal: Moderate improvement

## 2017-02-03 NOTE — BH THERAPY
" [facility]  Therapy Progress Note    Patient Name: Jerman Gilmore  Patient MRN: [id number]  Today's Date: 2/3/2017     Type of session:Individual psychotherapy  Length of session: 45  Persons in attendance:Patient    Subjective/New Info: individual session. Jerman processed feeling frustrated the past couple of days. He identified that social media jabs were triggering anger. He saw how his progress could be derailed by emotional upset. As he processed, he recalled past hurts and stuffed feelings. As he sorted through his tools, he identified strategies that took him through to amends to his parents for upset. Handling things differently he felt better.     Objective/Observations:   Participation: Active verbal participation, Attentive, Engaged and Open to feedback   Grooming: Casual and Neat   Cognition: Alert and Fully Oriented   Eye contact: Good   Mood: Anxious   Affect: Flexible and Full range   Thought process: Goal-directed   Speech: Rate within normal limits and Volume within normal limits   Other:     Diagnoses:   1. Panic disorder without agoraphobia         Current risk:   SUICIDE: Low   Homicide: Not applicable   Self-harm: Not applicable   Relapse: Not applicable   Other:    Safety Plan reviewed? Not Indicated   If evidence of imminent risk is present, intervention/plan:     Therapeutic Intervention(s): Communication skills, Distress tolerance skills, Interpersonal effectiveness skills, Positive behavior reinforced, Review treatment plan and Supportive psychotherapy    Treatment Goal(s)/Objective(s) addressed: ineffective coping     Progress toward Treatment Goals: Moderate improvement    Plan:  - Continue Intensive Outpatient Program  - \"Homework\" recommendation: anxiety worksheets  - Patient is in agreement with the above plan:  YES    Gwen Sneed R.N.  2/3/2017                                     "

## 2017-02-03 NOTE — BH THERAPY
Group Therapy Checklist  Attendance: Attended  Attendance Duration (min): 46-60  Number of Participants: 2  Program/Group: Partial Hospital Program  Topics Covered: Other (Comment): (creative art)  Participation: Active verbal participation, Attentive  Affect/Mood Range: Normal range, Flexible  Affect/Mood Display: Congruent w/content  Cognition: Alert, Oriented  Evidence of Imminent Suicide Risk: No  Evidence of imminent homicide risk: No  Therapeutic Interventions: Socialization  Progress Toward Treatment Goal: Moderate improvement

## 2017-02-03 NOTE — BH THERAPY
Group Therapy Checklist  Attendance: Attended  Attendance Duration (min): 46-60  Number of Participants: 3  Program/Group: Partial Hospital Program  Topics Covered:  (activity pictionary)  Participation: Active verbal participation, Attentive, Supportive to other group members  Affect/Mood Range: Normal range, Flexible  Affect/Mood Display: Congruent w/content  Cognition: Alert, Oriented  Evidence of Imminent Suicide Risk: No  Evidence of imminent homicide risk: No  Therapeutic Interventions: Socialization  Progress Toward Treatment Goal: Moderate improvement

## 2017-02-03 NOTE — BH THERAPY
Group Therapy Checklist  Attendance: Attended  Attendance Duration (min): 46-60  Number of Participants: 2  Program/Group: Partial Hospital Program  Topics Covered: Other (Comment): (courage)  Participation: Active verbal participation, Attentive  Affect/Mood Range: Normal range, Flexible  Affect/Mood Display: Congruent w/content  Cognition: Alert, Oriented  Evidence of Imminent Suicide Risk: No  Evidence of imminent homicide risk: No  Therapeutic Interventions: Values clarification  Progress Toward Treatment Goal: Moderate improvement

## 2017-02-06 ENCOUNTER — HOSPITAL ENCOUNTER (OUTPATIENT)
Dept: BEHAVIORAL HEALTH | Facility: MEDICAL CENTER | Age: 27
End: 2017-02-06
Attending: PSYCHIATRY & NEUROLOGY
Payer: OTHER GOVERNMENT

## 2017-02-06 DIAGNOSIS — F33.40 RECURRENT MAJOR DEPRESSIVE DISORDER, IN REMISSION (HCC): ICD-10-CM

## 2017-02-06 PROCEDURE — G0410 GRP PSYCH PARTIAL HOSP 45-50: HCPCS

## 2017-02-06 PROCEDURE — G0176 OPPS/PHP;ACTIVITY THERAPY: HCPCS

## 2017-02-06 PROCEDURE — G0177 OPPS/PHP; TRAIN & EDUC SERV: HCPCS

## 2017-02-06 NOTE — BH THERAPY
Group Therapy Checklist  Attendance Duration (min): 46-60  Number of Participants: 2  Program/Group: Partial Hospital Program  Topics Covered: Relaxation tech's  Participation: Active verbal participation, Attentive  Affect/Mood Range: Normal range, Flexible  Affect/Mood Display: Congruent w/content  Cognition: Alert, Oriented  Evidence of Imminent Suicide Risk: No  Evidence of imminent homicide risk: No  Therapeutic Interventions: Relaxationexercise  Progress Toward Treatment Goal: Moderate improvement. He has tried many relaxation techniques and has found some work better than others.  He uses exercise and being outdoors to help him relax and says he is very much affected by the weather and finds his depression is worse when the sun isn't out or it is windy.

## 2017-02-06 NOTE — BH THERAPY
Group Therapy Checklist  Attendance: Attended  Attendance Duration (min): 46-60  Number of Participants: 9  Program/Group: Partial Hospital Program  Topics Covered: Addiction behaviors  Participation: Active verbal participation  Affect/Mood Range: Flexible, Normal range  Affect/Mood Display: Congruent w/content  Cognition: Oriented, Alert  Evidence of Imminent Suicide Risk: No  Evidence of imminent homicide risk: No  Therapeutic Interventions: Emotion clarification  Progress Toward Treatment Goal: Moderate improvement

## 2017-02-06 NOTE — BH THERAPY
Group Therapy Checklist  Attendance: Attended  Number of Participants: 2  Program/Group: Partial Hospital Program  Topics Covered:  (art activity)  Participation: Active verbal participation, Attentive  Affect/Mood Range: Normal range, Flexible  Affect/Mood Display: Congruent w/content  Cognition: Alert, Oriented  Evidence of Imminent Suicide Risk: No  Evidence of imminent homicide risk: No  Therapeutic Interventions: Play therapy  Progress Toward Treatment Goal: Moderate improvement

## 2017-02-07 ENCOUNTER — HOSPITAL ENCOUNTER (OUTPATIENT)
Dept: BEHAVIORAL HEALTH | Facility: MEDICAL CENTER | Age: 27
End: 2017-02-07
Attending: PSYCHIATRY & NEUROLOGY
Payer: OTHER GOVERNMENT

## 2017-02-07 VITALS
SYSTOLIC BLOOD PRESSURE: 120 MMHG | WEIGHT: 160 LBS | BODY MASS INDEX: 25.11 KG/M2 | DIASTOLIC BLOOD PRESSURE: 74 MMHG | TEMPERATURE: 98.1 F | HEART RATE: 74 BPM | HEIGHT: 67 IN | RESPIRATION RATE: 16 BRPM

## 2017-02-07 DIAGNOSIS — F33.1 MODERATE EPISODE OF RECURRENT MAJOR DEPRESSIVE DISORDER (HCC): ICD-10-CM

## 2017-02-07 DIAGNOSIS — F43.10 POST TRAUMATIC STRESS DISORDER: ICD-10-CM

## 2017-02-07 PROCEDURE — G0177 OPPS/PHP; TRAIN & EDUC SERV: HCPCS

## 2017-02-07 PROCEDURE — G0411 INTER ACTIVE GRP PSYCH PARTI: HCPCS

## 2017-02-07 PROCEDURE — 99215 OFFICE O/P EST HI 40 MIN: CPT | Mod: 25

## 2017-02-07 PROCEDURE — G0176 OPPS/PHP;ACTIVITY THERAPY: HCPCS

## 2017-02-07 PROCEDURE — 99213 OFFICE O/P EST LOW 20 MIN: CPT | Performed by: PSYCHIATRY & NEUROLOGY

## 2017-02-07 NOTE — BH THERAPY
" [facility]  Therapy Progress Note    Patient Name: Jerman Gilmore  Patient MRN: [id number]  Today's Date: 2/7/2017     Type of session:Individual psychotherapy  Length of session: 45  Persons in attendance:Patient    Subjective/New Info: Individual session. Jerman describes his anxiety as improved; however, he feels his depression is worse (or more identified due to less anxiety). Processed ego and self esteem with negative self talk sabotaging his recovery efforts. Looked at jealousy and fear of the unknown as triggers. Will talk to his chief next week to learn more of his process and help with fear of the unknown.     Objective/Observations:   Participation: Active verbal participation, Attentive, Engaged and Open to feedback   Grooming: Casual and Neat   Cognition: Alert and Fully Oriented   Eye contact: Good   Mood: Depressed and Anxious   Affect: Flexible and Full range   Thought process: Logical and Goal-directed   Speech: Rate within normal limits and Volume within normal limits   Other:     Diagnoses: No diagnosis found.     Current risk:   SUICIDE: Low   Homicide: Not applicable   Self-harm: Not applicable   Relapse: Not applicable   Other:    Safety Plan reviewed? Not Indicated   If evidence of imminent risk is present, intervention/plan:     Therapeutic Intervention(s): Communication skills, Interpersonal effectiveness skills, Review treatment plan, Socialization and Supportive psychotherapy    Treatment Goal(s)/Objective(s) addressed: ineffective coping     Progress toward Treatment Goals: Moderate improvement    Plan:  - Continue Partial Hospital Program  - \"Homework\" recommendation: Depression worksheet  - Next appointment scheduled:  2/8/2017  - Patient is in agreement with the above plan:  YES    Gwen Sneed R.N.  2/7/2017                                     "

## 2017-02-07 NOTE — BH THERAPY
Group Therapy Checklist  Attendance: Attended  Attendance Duration (min): 46-60  Number of Participants: 2  Program/Group: Partial Hospital Program  Topics Covered: Forgiveness  Participation: Active verbal participation  Affect/Mood Range: Flexible, Normal range  Affect/Mood Display: Congruent w/content  Cognition: Oriented, Alert  Evidence of Imminent Suicide Risk: No  Evidence of imminent homicide risk: No  Therapeutic Interventions: Emotion clarification  Progress Toward Treatment Goal: Moderate improvement

## 2017-02-07 NOTE — BH THERAPY
Group Therapy Checklist  Attendance: Attended  Attendance Duration (min): 46-60  Number of Participants: 2  Program/Group: Partial Hospital Program  Topics Covered: Forgiveness  Participation: Active verbal participation  Affect/Mood Range: Normal range, Flexible  Affect/Mood Display: Congruent w/content  Cognition: Oriented, Alert  Evidence of Imminent Suicide Risk: No  Evidence of imminent homicide risk: No  Therapeutic Interventions: Emotion clarification  Progress Toward Treatment Goal: Moderate improvement

## 2017-02-07 NOTE — PROGRESS NOTES
"[facility]  PSYCHIATRIC FOLLOW-UP NOTE    Name: Jerman Gilmore  MRN: [id number]  : 1990  Age: 26 y.o.  Date of assessment: 2017  PCP: Kwesi Lange M.D.  Persons in attendance: Patient    REASON FOR VISIT/CHIEF COMPLAINT (as stated by Patient):  Jerman Gilmore is a 26 y.o., White male, attending follow-up appointment for   Chief Complaint   Patient presents with   • Depression     The patient is seen today for follow up on his depression, anxiety, and insomnia.   .      HISTORY OF PRESENT ILLNESS:    The patient is seen today in [facility] hospital program and he is les anxious and his depression unchanged. The patient feels like he is failure and his self esteem is down. The patient is sleeping well on trazodone and clonazepam. The patient is taking prozac 40 mg and he denied any side effects. The patient is staying with his parents and they are supportive.        PSYCHOSOCIAL CHANGES SINCE PREVIOUS CONTACT:  Anxiety improved and depression unchanged.    RESPONSE TO TREATMENT:  Fair.    MEDICATION SIDE EFFECTS:  Denied.    REVIEW OF SYSTEMS:        Constitutional negative   Eyes negative   Ears/Nose/Mouth/Throat negative   Cardiovascular negative   Respiratory negative   Gastrointestinal negative   Genitourinary negative   Muscular negative   Integumentary negative   Neurological negative   Endocrine negative   Hematologic/Lymphatic negative       PSYCHIATRIC EXAMINATION/MENTAL STATUS  /74 mmHg  Pulse 74  Temp(Src) 36.7 °C (98.1 °F)  Resp 16  Ht 1.7 m (5' 6.93\")  Wt 72.576 kg (160 lb)  BMI 25.11 kg/m2  Participation: Active verbal participation, Attentive and Engaged  Grooming:Casual  Orientation: Alert and Fully Oriented  Eye contact: Good  Behavior:Calm   Mood: Depressed  Affect: Sad  Thought process: Logical and Goal-directed  Thought content:  Within normal limits  Speech: Rate within normal limits and Volume within normal limits  Perception:  Within normal limits  Memory:  " No gross evidence of memory deficits  Insight: Good  Judgment: Good  Family/couple interaction observations:   Other:    Current risk:    Suicide: Low   Homicide: Low   Self-harm: Low  Relapse: Low  Other:   Crisis Safety Plan reviewed?Yes  If evidence of imminent risk is present, intervention/plan:    Medical Records/Labs/Diagnostic Tests Reviewed: yes.    Medical Records/Labs/Diagnostic Tests Ordered: none.    DIAGNOSTIC IMPRESSION(S):  1. Post traumatic stress disorder    2. Moderate episode of recurrent major depressive disorder (CMS-[facility])           ASSESSMENT AND PLAN:  Major depression  PTSD    Prozac 40 mg per day  Trazodone 100 mg at night  Clonazepam 1 mg at night  Clonazepam 0.25 mg per day.  Follow up in [facility]    More than 50% of face-to-face time in this 30 minute visit was spent in psychotherapy/counseling.    Topics addressed include:  Continue  Individual therapy  Group therapy  And other activities.    Vineet Riojas M.D.

## 2017-02-08 ENCOUNTER — HOSPITAL ENCOUNTER (OUTPATIENT)
Dept: BEHAVIORAL HEALTH | Facility: MEDICAL CENTER | Age: 27
End: 2017-02-08
Attending: PSYCHIATRY & NEUROLOGY
Payer: OTHER GOVERNMENT

## 2017-02-08 DIAGNOSIS — F43.10 POST TRAUMATIC STRESS DISORDER: ICD-10-CM

## 2017-02-08 PROCEDURE — G0176 OPPS/PHP;ACTIVITY THERAPY: HCPCS

## 2017-02-08 PROCEDURE — G0177 OPPS/PHP; TRAIN & EDUC SERV: HCPCS

## 2017-02-08 PROCEDURE — G0410 GRP PSYCH PARTIAL HOSP 45-50: HCPCS

## 2017-02-08 NOTE — BH THERAPY
Group Therapy Checklist  Attendance: Attended  Attendance Duration (min): 46-60  Number of Participants: 2  Program/Group: Partial Hospital Program  Topics Covered: Cognitive distortions  Participation: Active verbal participation  Affect/Mood Range: Normal range, Flexible  Affect/Mood Display: Congruent w/content  Cognition: Oriented, Alert  Evidence of Imminent Suicide Risk: No  Evidence of imminent homicide risk: No  Therapeutic Interventions: Cognitive modification  Progress Toward Treatment Goal: Moderate improvement

## 2017-02-08 NOTE — BH THERAPY
Group Therapy Checklist  Attendance: Attended  Attendance Duration (min): 46-60  Number of Participants: 2  Program/Group: Partial Hospital Program  Topics Covered: Other (Comment): (art therapy)  Participation: Active verbal participation, Attentive  Affect/Mood Range: Flexible, Normal range  Affect/Mood Display: Congruent w/content  Cognition: Oriented, Alert  Evidence of Imminent Suicide Risk: No  Evidence of imminent homicide risk: No  Therapeutic Interventions: Play therapy  Progress Toward Treatment Goal: Moderate improvement

## 2017-02-08 NOTE — BH THERAPY
Group Therapy Checklist  Attendance: Attended  Attendance Duration (min): 46-60  Number of Participants: 9  Program/Group: Partial Hospital Program  Topics Covered: 12 Step orientation  Participation: Active verbal participation  Affect/Mood Range: Normal range, Flexible  Affect/Mood Display: Congruent w/content  Cognition: Oriented, Alert  Evidence of Imminent Suicide Risk: No  Evidence of imminent homicide risk: No  Therapeutic Interventions: Cognitive clarification  Progress Toward Treatment Goal: Moderate improvement

## 2017-02-09 ENCOUNTER — HOSPITAL ENCOUNTER (OUTPATIENT)
Dept: BEHAVIORAL HEALTH | Facility: MEDICAL CENTER | Age: 27
End: 2017-02-09
Attending: PSYCHIATRY & NEUROLOGY
Payer: OTHER GOVERNMENT

## 2017-02-09 DIAGNOSIS — F43.10 POST TRAUMATIC STRESS DISORDER: ICD-10-CM

## 2017-02-09 PROCEDURE — G0411 INTER ACTIVE GRP PSYCH PARTI: HCPCS

## 2017-02-09 PROCEDURE — G0177 OPPS/PHP; TRAIN & EDUC SERV: HCPCS

## 2017-02-09 PROCEDURE — G0176 OPPS/PHP;ACTIVITY THERAPY: HCPCS

## 2017-02-09 NOTE — BH THERAPY
Group Therapy Checklist  Attendance: Attended  Attendance Duration (min): 46-60  Number of Participants: 2  Program/Group: Partial Hospital Program  Topics Covered: Weekend planning  Participation: Active verbal participation  Affect/Mood Range: Flexible, Normal range  Affect/Mood Display: Congruent w/content  Cognition: Oriented, Alert  Evidence of Imminent Suicide Risk: No  Evidence of imminent homicide risk: No  Therapeutic Interventions: Self-care skills  Progress Toward Treatment Goal: Moderate improvement

## 2017-02-09 NOTE — BH THERAPY
Group Therapy Checklist  Attendance: Attended  Attendance Duration (min): Greater than 60  Number of Participants: 5  Program/Group: Partial Hospital Program  Topics Covered: Other (Comment): (Process Group)  Participation: Active verbal participation, Supportive to other group members, Attentive (Finds it difficult to hear of another's distress without being able to fix it.  Gave reassurance that finding the right medication can take time.)  Affect/Mood Range: Normal range, Flexible  Affect/Mood Display: Congruent w/content  Cognition: Alert, Oriented  Evidence of Imminent Suicide Risk: No  Evidence of imminent homicide risk: No  Therapeutic Interventions: Emotion clarification, Cognitive clarification, Interpersonal effectiveness skills, Supportive psychotherapy  Progress Toward Treatment Goal: Moderate improvement. Continue PHP, meds

## 2017-02-09 NOTE — BH THERAPY
Group Therapy Checklist  Attendance Duration (min): 46-60  Number of Participants: 2  Program/Group: Partial Hospital Program  Topics Covered:  (activity collage)  Participation: Active verbal participation  Affect/Mood Range: Flexible, Normal range  Affect/Mood Display: Congruent w/content  Cognition: Alert, Oriented  Evidence of Imminent Suicide Risk: No  Evidence of imminent homicide risk: No  Therapeutic Interventions: Play therapy  Progress Toward Treatment Goal: Moderate improvement

## 2017-02-09 NOTE — BH THERAPY
Group Therapy Checklist  Attendance: Attended  Attendance Duration (min): 46-60  Number of Participants: 5  Program/Group: Partial Hospital Program  Topics Covered: Relationships in Recovery  Participation: Active verbal participation, Attentive, Open to feedback, Supportive to other group members (Identified possibly monopolizing, encouraged others to share.  Appreciative of parents' support and response to his requests.  Sharing helping others with rides.  ID'd some of  relationships as toxic.)  Affect/Mood Range: Normal range, Flexible  Affect/Mood Display: Congruent w/content  Cognition: Alert, Oriented  Evidence of Imminent Suicide Risk: No  Evidence of imminent homicide risk: No  Therapeutic Interventions: Cognitive clarification, Emotion clarification, Interpersonal effectiveness skills  Progress Toward Treatment Goal: Moderate improvement

## 2017-02-10 ENCOUNTER — HOSPITAL ENCOUNTER (OUTPATIENT)
Dept: BEHAVIORAL HEALTH | Facility: MEDICAL CENTER | Age: 27
End: 2017-02-10
Attending: PSYCHIATRY & NEUROLOGY
Payer: OTHER GOVERNMENT

## 2017-02-10 VITALS
DIASTOLIC BLOOD PRESSURE: 72 MMHG | BODY MASS INDEX: 25.11 KG/M2 | WEIGHT: 160 LBS | HEIGHT: 67 IN | TEMPERATURE: 98.1 F | HEART RATE: 76 BPM | SYSTOLIC BLOOD PRESSURE: 120 MMHG | RESPIRATION RATE: 14 BRPM

## 2017-02-10 DIAGNOSIS — F33.1 MODERATE EPISODE OF RECURRENT MAJOR DEPRESSIVE DISORDER (HCC): ICD-10-CM

## 2017-02-10 DIAGNOSIS — F43.10 POST TRAUMATIC STRESS DISORDER: ICD-10-CM

## 2017-02-10 PROCEDURE — G0176 OPPS/PHP;ACTIVITY THERAPY: HCPCS

## 2017-02-10 PROCEDURE — 99214 OFFICE O/P EST MOD 30 MIN: CPT | Mod: 25

## 2017-02-10 PROCEDURE — G0177 OPPS/PHP; TRAIN & EDUC SERV: HCPCS

## 2017-02-10 PROCEDURE — G0411 INTER ACTIVE GRP PSYCH PARTI: HCPCS

## 2017-02-10 PROCEDURE — 99213 OFFICE O/P EST LOW 20 MIN: CPT | Performed by: PSYCHIATRY & NEUROLOGY

## 2017-02-10 NOTE — BH THERAPY
Group Therapy Checklist  Attendance: Attended  Attendance Duration (min): Greater than 60  Number of Participants: 5  Program/Group: Partial Hospital Program  Topics Covered: Values based action  Participation: Active verbal participation (Shared that he recognizes a lot of codependent traits that he wants to work on.  Plans for the weekend to include maintaining a schedule of necessary action.  Receptive to support from peers.)  Affect/Mood Range: Flexible, Normal range  Affect/Mood Display: Congruent w/content  Cognition: Oriented, Alert  Evidence of Imminent Suicide Risk: No  Evidence of imminent homicide risk: No  Therapeutic Interventions: Emotion clarification  Progress Toward Treatment Goal: Moderate improvement

## 2017-02-10 NOTE — PROGRESS NOTES
"[facility]  PSYCHIATRIC FOLLOW-UP NOTE    Name: Jerman Gilmore  MRN: [id number]  : 1990  Age: 26 y.o.  Date of assessment: 2/10/2017  PCP: Kwesi Lange M.D.  Persons in attendance: Patient    REASON FOR VISIT/CHIEF COMPLAINT (as stated by Patient):  Jerman Gilmore is a 26 y.o., White male, attending follow-up appointment for   Chief Complaint   Patient presents with   • Anxiety     The patient is seen today for follow up in PHP and he is feeling more relaxed and less anxious.    .      HISTORY OF PRESENT ILLNESS:    The patient is seen today for follow up in PHP and he reported that he is feeling better and he is able to concentrate in his regular life instead of worrying about every thing. The patient has been sleeping better with trazodone, clonazepam and vistaril. The patient is taking prozac 40 mg in AM with clonazepam 0.25 mg . The patient is able to relax at home with his parent.     PSYCHOSOCIAL CHANGES SINCE PREVIOUS CONTACT:  More relaxed and less anxious.    RESPONSE TO TREATMENT:  Good.    MEDICATION SIDE EFFECTS:  Denied.    REVIEW OF SYSTEMS:        Constitutional negative   Eyes negative   Ears/Nose/Mouth/Throat negative   Cardiovascular negative   Respiratory negative   Gastrointestinal negative   Genitourinary negative   Muscular negative   Integumentary negative   Neurological negative   Endocrine negative   Hematologic/Lymphatic negative       PSYCHIATRIC EXAMINATION/MENTAL STATUS  /72 mmHg  Pulse 76  Temp(Src) 36.7 °C (98.1 °F)  Resp 14  Ht 1.7 m (5' 6.93\")  Wt 72.576 kg (160 lb)  BMI 25.11 kg/m2  Participation: Active verbal participation, Attentive and Engaged  Grooming:Casual  Orientation: Alert and Fully Oriented  Eye contact: Good  Behavior:Calm   Mood: Anxious  Affect: Flexible and Full range  Thought process: Logical and Goal-directed  Thought content:  Within normal limits  Speech: Rate within normal limits and Volume within normal limits  Perception:  " Within normal limits  Memory:  No gross evidence of memory deficits  Insight: Good  Judgment: Good  Family/couple interaction observations:   Other:    Current risk:    Suicide: Moderate The patient put a gun to his head once and did not pul the trigger.   Homicide: Low   Self-harm: Moderate  Relapse: Low  Other:   Crisis Safety Plan reviewed?Yes  If evidence of imminent risk is present, intervention/plan:    Medical Records/Labs/Diagnostic Tests Reviewed: yes.    Medical Records/Labs/Diagnostic Tests Ordered: none.    DIAGNOSTIC IMPRESSION(S):  1. Post traumatic stress disorder    2. Moderate episode of recurrent major depressive disorder (CMS-[facility])           ASSESSMENT AND PLAN:  Major depression  PTSD    Continue  Fluoxetine 40 mg in AM  Clonazepam 0.25 mg in AM and clonazepam 1 mg at nigh  Trazodone 100 mg at night  Hydroxyzine 50 mg at night  Follow up in PHP.    More than 50% of face-to-face time in this 30 minute visit was spent in psychotherapy/counseling.    Topics addressed include:  PHP.  Vineet Riojas M.D.

## 2017-02-10 NOTE — BH THERAPY
Group Therapy Checklist  Attendance: Attended  Attendance Duration (min): 46-60  Number of Participants: 5  Program/Group: Partial Hospital Program  Topics Covered: Codependency  Participation: Active verbal participation  Affect/Mood Range: Flexible, Normal range  Affect/Mood Display: Congruent w/content  Cognition: Oriented, Alert  Evidence of Imminent Suicide Risk: No  Evidence of imminent homicide risk: No  Therapeutic Interventions: Cognitive clarification  Progress Toward Treatment Goal: Moderate improvement

## 2017-02-11 NOTE — BH THERAPY
Group Therapy Checklist  Attendance: Attended  Attendance Duration (min): 46-60  Number of Participants: 2  Program/Group: Partial Hospital Program  Topics Covered: Relaxation tech's  Affect/Mood Range: Normal range, Flexible  Affect/Mood Display: Congruent w/content  Cognition: Alert, Oriented  Evidence of Imminent Suicide Risk: No  Evidence of imminent homicide risk: No  Therapeutic Interventions: Mindfulness exercise  Progress Toward Treatment Goal: Moderate improvement

## 2017-02-11 NOTE — BH THERAPY
Group Therapy Checklist  Attendance: Attended  Attendance Duration (min): 46-60  Number of Participants: 2  Program/Group: Partial Hospital Program  Topics Covered: Relaxation tech's  Participation: Active verbal participation  Affect/Mood Range: Flexible, Normal range  Affect/Mood Display: Congruent w/content  Cognition: Oriented, Alert  Evidence of Imminent Suicide Risk: No  Evidence of imminent homicide risk: No  Therapeutic Interventions: Mindfulness exercise  Progress Toward Treatment Goal: Moderate improvement

## 2017-02-13 ENCOUNTER — HOSPITAL ENCOUNTER (OUTPATIENT)
Dept: BEHAVIORAL HEALTH | Facility: MEDICAL CENTER | Age: 27
End: 2017-02-13
Attending: PSYCHIATRY & NEUROLOGY
Payer: OTHER GOVERNMENT

## 2017-02-13 VITALS
TEMPERATURE: 97.7 F | WEIGHT: 160 LBS | HEIGHT: 67 IN | BODY MASS INDEX: 25.11 KG/M2 | RESPIRATION RATE: 16 BRPM | DIASTOLIC BLOOD PRESSURE: 76 MMHG | HEART RATE: 76 BPM | SYSTOLIC BLOOD PRESSURE: 126 MMHG

## 2017-02-13 DIAGNOSIS — F33.0 MILD EPISODE OF RECURRENT MAJOR DEPRESSIVE DISORDER (HCC): ICD-10-CM

## 2017-02-13 DIAGNOSIS — F43.10 PTSD (POST-TRAUMATIC STRESS DISORDER): ICD-10-CM

## 2017-02-13 PROCEDURE — 99213 OFFICE O/P EST LOW 20 MIN: CPT | Performed by: PSYCHIATRY & NEUROLOGY

## 2017-02-13 PROCEDURE — G0177 OPPS/PHP; TRAIN & EDUC SERV: HCPCS

## 2017-02-13 PROCEDURE — 99215 OFFICE O/P EST HI 40 MIN: CPT | Mod: 25

## 2017-02-13 PROCEDURE — G0411 INTER ACTIVE GRP PSYCH PARTI: HCPCS

## 2017-02-13 NOTE — ADDENDUM NOTE
Encounter addended by: Joseluis Newberry P.H.D. on: 2/13/2017  2:08 PM<BR>     Documentation filed: Notes Section

## 2017-02-13 NOTE — CARE PLAN
Problem: Ineffective Coping  Goal: Symptom reduction and improved functioning  Intervention: Partial Hospitalization Program  Plan: to modify treatment from five days weekly to three days weekly .   Intervention: Psychiatric Evaluation and Management  Weekly medication management  Intervention: Anxiety Management  Anxiety is increasing with the uncertainty of his  life following PHP. Plan: talk to Chief Ashok today.   Intervention: Journaling as assigned  Jerman continues daily journaling.

## 2017-02-13 NOTE — ADDENDUM NOTE
Encounter addended by: Gwen Sneed R.N. on: 2/13/2017  3:38 PM<BR>     Documentation filed: Notes Section

## 2017-02-13 NOTE — ADDENDUM NOTE
Encounter addended by: Gwen Sneed R.N. on: 2/13/2017  1:10 PM<BR>     Documentation filed: Clinical Notes, Admin, Inpatient Care Plan

## 2017-02-13 NOTE — BH THERAPY
Group Therapy Checklist  Attendance: Attended  Attendance Duration (min): 46-60  Number of Participants: 2  Program/Group: Partial Hospital Program  Topics Covered: Other (Comment): (creative art)  Participation: Active verbal participation  Affect/Mood Range: Normal range, Flexible  Affect/Mood Display: Congruent w/content  Cognition: Alert, Oriented  Evidence of Imminent Suicide Risk: No  Evidence of imminent homicide risk: No  Therapeutic Interventions: Socialization  Progress Toward Treatment Goal: Mild improvement

## 2017-02-13 NOTE — BH THERAPY
" [facility]  Therapy Progress Note    Patient Name: Jerman Gilmore  Patient MRN: [id number]  Today's Date: 2/13/2017     Type of session:Individual psychotherapy  Length of session: 45  Persons in attendance:Patient    Subjective/New Info: individual session. Processed anxiety, fear of the unknown and his desire to know future  plans. Processed his past experience with this feeling and identified negative self talk \"I'm milking the system\" using Socratic dialogue. Realistic responses and a call to his chief were helpful but not the solution he longs for: a plan. Will continue to be in dialogue with his commander, try again tomorrow [phone] and via email. Discussed outside therapy with Nika Gordon who he saw before this stay as part of relapse prevention plan and his discharge plan.    Objective/Observations:   Participation: Active verbal participation, Attentive, Engaged and Open to feedback   Grooming: Casual and Neat   Cognition: Alert and Fully Oriented   Eye contact: Limited   Mood: Anxious   Affect: Flexible and Full range   Thought process: Logical and Goal-directed   Speech: Rate within normal limits and Volume within normal limits   Other:     Diagnoses:   1. Mild episode of recurrent major depressive disorder (CMS-HCC)    2. PTSD (post-traumatic stress disorder)         Current risk:   SUICIDE: Low   Homicide: Not applicable   Self-harm: Not applicable   Relapse: Not applicable   Other:    Safety Plan reviewed? Not Indicated   If evidence of imminent risk is present, intervention/plan:     Therapeutic Intervention(s): Communication skills, Conflict resolution skills, Distress tolerance skills, Problem-solving, Review treatment plan, Self-care skills, Stressors assessed and Supportive psychotherapy    Treatment Goal(s)/Objective(s) addressed: ineffective coping.      Progress toward Treatment Goals: Mild improvement    Plan:  - Continue Partial Hospital Program ~ look at going from five " days weekly to three days weekly.   - Next appointment scheduled:  2/14/2017  - Patient is in agreement with the above plan:  YES    Gwen Sneed R.N.  2/13/2017

## 2017-02-13 NOTE — PROGRESS NOTES
"[facility]  PSYCHIATRIC FOLLOW-UP NOTE    Name: Jerman Gilmore  MRN: [id number]  : 1990  Age: 26 y.o.  Date of assessment: 2017  PCP: Kwesi Lange M.D.  Persons in attendance: Patient    REASON FOR VISIT/CHIEF COMPLAINT (as stated by Patient):  Jerman Gilmore is a 26 y.o., White male, attending follow-up appointment for   Chief Complaint   Patient presents with   • Anxiety     The patient is seen today for follow up and he reported mild anxiety.    .      HISTORY OF PRESENT ILLNESS:    This is a 25 yo male, seen today in PHP and he reported in creased anxiety today. The patient had good weekend. The patient spend time with his family. The patient is anxious about his plan with  and his future. The patient is willing try to work in day shift and he is afraid of doing any night shift. 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 The patient has been sleeping well on trazodone and clonazepam and his depression improved on prozac.    PSYCHOSOCIAL CHANGES SINCE PREVIOUS CONTACT:  Depression improved and has mild anxiety today.     RESPONSE TO TREATMENT:  Good.    MEDICATION SIDE EFFECTS:  Denied.    REVIEW OF SYSTEMS:        Constitutional negative   Eyes negative   Ears/Nose/Mouth/Throat negative   Cardiovascular negative   Respiratory negative   Gastrointestinal negative   Genitourinary negative   Muscular negative   Integumentary negative   Neurological negative   Endocrine negative   Hematologic/Lymphatic negative       PSYCHIATRIC EXAMINATION/MENTAL STATUS  /76 mmHg  Pulse 76  Temp(Src) 36.5 °C (97.7 °F)  Resp 16  Ht 1.7 m (5' 6.93\")  Wt 72.576 kg (160 lb)  BMI 25.11 kg/m2  Participation: Active verbal participation and Attentive  Grooming:Casual  Orientation: Alert and Fully Oriented  Eye contact: Limited  Behavior:Calm   Mood: Anxious  Affect: Flexible  Thought process: Logical and Goal-directed  Thought content:  Within normal limits  Speech: Rate " within normal limits and Volume within normal limits  Perception:  Within normal limits  Memory:  No gross evidence of memory deficits  Insight: Good  Judgment: Good  Family/couple interaction observations:   Other:    Current risk:    Suicide: Low   Homicide: Low   Self-harm: Low  Relapse: Low  Other:   Crisis Safety Plan reviewed?Yes  If evidence of imminent risk is present, intervention/plan:    Medical Records/Labs/Diagnostic Tests Reviewed: yes.    Medical Records/Labs/Diagnostic Tests Ordered: none.    DIAGNOSTIC IMPRESSION(S):  1. Mild episode of recurrent major depressive disorder (CMS-HCC)    2. PTSD (post-traumatic stress disorder)           ASSESSMENT AND PLAN:  Major depression  PTSD    Continue  Fluoxetine 40 mg per day  Trazodone 100 mg at night  Clonazepam 1 mg at night and clonazepam 0.25 mg in AM  Hydroxyzine 50 mg at night  The patient requested a prescription for his medications on this Friday.      More than 50% of face-to-face time in this 30 minute visit was spent in psychotherapy/counseling.    Topics addressed include:  BRENDA Riojas M.D.

## 2017-02-13 NOTE — BH THERAPY
Group Therapy Checklist  Attendance: Attended  Attendance Duration (min): 46-60  Number of Participants: 11  Program/Group: Partial Hospital Program  Topics Covered: Steps 1/2/3  Participation: Active verbal participation, Attentive  Affect/Mood Range: Normal range, Flexible  Affect/Mood Display: Congruent w/content  Cognition: Alert, Oriented  Evidence of Imminent Suicide Risk: No  Evidence of imminent homicide risk: No  Therapeutic Interventions: Relapse prevention  Progress Toward Treatment Goal: Mild improvement

## 2017-02-13 NOTE — ADDENDUM NOTE
Encounter addended by: Gwen Sneed R.N. on: 2/13/2017 11:35 AM<BR>     Documentation filed: Charges VN

## 2017-02-14 ENCOUNTER — HOSPITAL ENCOUNTER (OUTPATIENT)
Dept: BEHAVIORAL HEALTH | Facility: MEDICAL CENTER | Age: 27
End: 2017-02-14
Attending: PSYCHIATRY & NEUROLOGY
Payer: OTHER GOVERNMENT

## 2017-02-14 DIAGNOSIS — F43.10 PTSD (POST-TRAUMATIC STRESS DISORDER): ICD-10-CM

## 2017-02-14 PROCEDURE — G0177 OPPS/PHP; TRAIN & EDUC SERV: HCPCS

## 2017-02-14 PROCEDURE — G0410 GRP PSYCH PARTIAL HOSP 45-50: HCPCS

## 2017-02-14 PROCEDURE — G0176 OPPS/PHP;ACTIVITY THERAPY: HCPCS

## 2017-02-14 NOTE — BH THERAPY
[facility]  Therapy Progress Note    Patient Name: Jerman Gilmore  Patient MRN: [id number]  Today's Date: 2/14/2017     Type of session:Case management  Length of session: 40  Persons in attendance:Patient and PC conference call: Chief Silva     Subjective/New Info: Individual session with PC to ARIE SILVA, CMSgt, NVANG  [address]  Superintendent  [phone]. COMM [phone] BB [phone]  NIPR: [email or web address]  To discuss treatment plan with his  liason. The plan will be to continue five days PHP this week and go to three days TWTh for two weeks. Jerman will report for work on the 24th with [employer] and work Monday and Friday, then evolve into a FT position as his treatment provides. Will follow up with a projected long term plan by weeks end. Jerman seems relieved with the plan as part of his recent anxiety has been returning to work and in what capacity.       Objective/Observations:   Participation: Active verbal participation, Attentive, Engaged and Open to feedback   Grooming: Good, Casual and Neat   Cognition: Alert and Fully Oriented   Eye contact: Good   Mood: Anxious   Affect: Flexible and Full range   Thought process: Logical and Goal-directed   Speech: Rate within normal limits and Volume within normal limits   Other:     Diagnoses: No diagnosis found.     Current risk:   SUICIDE: low   Homicide: Not applicable   Self-harm: Not applicable   Relapse: Not applicable   Other:    Safety Plan reviewed? Not Indicated   If evidence of imminent risk is present, intervention/plan:     Therapeutic Intervention(s): Communication skills, Distress tolerance skills, Goal-setting, Problem-solving, Review treatment plan and Supportive psychotherapy    Treatment Goal(s)/Objective(s) addressed: ineffective coping     Progress toward Treatment Goals: Significant improvement    Plan:  - Continue Partial Hospital Program  - Next  appointment scheduled:  2/15/2017  - Decrease frequency of sessions  - Patient is in agreement with the above plan:  YES    Gwen Sneed R.N.  2/14/2017

## 2017-02-14 NOTE — BH THERAPY
Group Therapy Checklist  Attendance: Attended  Attendance Duration (min): Greater than 60  Number of Participants: 6  Program/Group: Partial Hospital Program  Topics Covered: Letting Go  Participation: Active verbal participation, Attentive (Shared that he has some anxiety about finding a job that he will be able to do.  Expressed graditude toward his parents for supporting him through this episode.)  Affect/Mood Range: Flexible, Normal range  Affect/Mood Display: Congruent w/content  Cognition: Oriented, Alert  Evidence of Imminent Suicide Risk: No  Evidence of imminent homicide risk: No  Therapeutic Interventions: Emotion clarification  Progress Toward Treatment Goal: Moderate improvement

## 2017-02-14 NOTE — BH THERAPY
Group Therapy Checklist  Attendance: Attended  Attendance Duration (min): 46-60  Number of Participants: 3  Program/Group: Partial Hospital Program  Topics Covered: Self Affirmation  Participation: Active verbal participation  Affect/Mood Range: Normal range, Flexible  Affect/Mood Display: Congruent w/content  Cognition: Alert, Oriented  Evidence of Imminent Suicide Risk: No  Evidence of imminent homicide risk: No  Therapeutic Interventions: Behavioral activation, Distress tolerance skills, Interpersonal effectiveness skills  Progress Toward Treatment Goal: Moderate improvement

## 2017-02-14 NOTE — BH THERAPY
Group Therapy Checklist  Attendance: Attended  Attendance Duration (min): 46-60  Number of Participants: 5  Program/Group: Partial Hospital Program  Topics Covered: Forgiveness  Participation: Active verbal participation  Affect/Mood Range: Normal range, Flexible  Affect/Mood Display: Congruent w/content  Cognition: Alert, Oriented  Evidence of Imminent Suicide Risk: No  Evidence of imminent homicide risk: No  Therapeutic Interventions: Emotion clarification, Cognitive clarification, Distress tolerance skills  Progress Toward Treatment Goal: Moderate improvement

## 2017-02-14 NOTE — BH THERAPY
Group Therapy Checklist  Attendance: Attended  Attendance Duration (min): 46-60  Number of Participants: 2  Program/Group: Partial Hospital Program  Topics Covered: Anger  Participation: Active verbal participation, Attentive, Supportive to other group members  Affect/Mood Range: Normal range, Flexible  Affect/Mood Display: Congruent w/content  Cognition: Alert, Oriented  Evidence of Imminent Suicide Risk: No  Evidence of imminent homicide risk: No  Therapeutic Interventions: Emotion clarification, Problem-solving, Distress tolerance skills  Progress Toward Treatment Goal: Moderate improvement.

## 2017-02-14 NOTE — BH THERAPY
Group Therapy Checklist  Attendance: Attended  Attendance Duration (min): 46-60  Number of Participants: 3  Program/Group: Partial Hospital Program  Topics Covered: Other (Comment): (meditation)  Participation: Active verbal participation  Affect/Mood Range: Normal range, Flexible  Affect/Mood Display: Congruent w/content  Cognition: Alert, Oriented  Evidence of Imminent Suicide Risk: No  Evidence of imminent homicide risk: No  Therapeutic Interventions: Mindfulness exercise  Progress Toward Treatment Goal: Mild improvement

## 2017-02-15 ENCOUNTER — HOSPITAL ENCOUNTER (OUTPATIENT)
Dept: BEHAVIORAL HEALTH | Facility: MEDICAL CENTER | Age: 27
End: 2017-02-15
Attending: PSYCHIATRY & NEUROLOGY
Payer: OTHER GOVERNMENT

## 2017-02-15 DIAGNOSIS — F43.10 POSTTRAUMATIC STRESS DISORDER: ICD-10-CM

## 2017-02-15 PROCEDURE — G0177 OPPS/PHP; TRAIN & EDUC SERV: HCPCS

## 2017-02-15 PROCEDURE — G0411 INTER ACTIVE GRP PSYCH PARTI: HCPCS

## 2017-02-15 NOTE — BH THERAPY
Group Therapy Checklist  Attendance: Attended  Attendance Duration (min): 46-60  Number of Participants: 10  Program/Group: Partial Hospital Program  Topics Covered: Caretaking  Participation: Active verbal participation  Affect/Mood Range: Normal range, Flexible  Affect/Mood Display: Congruent w/content  Cognition: Alert, Oriented  Evidence of Imminent Suicide Risk: No  Evidence of imminent homicide risk: No  Therapeutic Interventions: Cognitive clarification  Progress Toward Treatment Goal: Mild improvement

## 2017-02-15 NOTE — BH THERAPY
Pt left for the day at noon with complaint of fatigue and sore throat. RTC when feeling better. Denies SI/ HI/ AVH.

## 2017-02-16 ENCOUNTER — HOSPITAL ENCOUNTER (OUTPATIENT)
Dept: BEHAVIORAL HEALTH | Facility: MEDICAL CENTER | Age: 27
End: 2017-02-16
Attending: PSYCHIATRY & NEUROLOGY
Payer: OTHER GOVERNMENT

## 2017-02-16 ENCOUNTER — TELEPHONE (OUTPATIENT)
Dept: BEHAVIORAL HEALTH | Facility: MEDICAL CENTER | Age: 27
End: 2017-02-16

## 2017-02-17 ENCOUNTER — HOSPITAL ENCOUNTER (OUTPATIENT)
Dept: BEHAVIORAL HEALTH | Facility: MEDICAL CENTER | Age: 27
End: 2017-02-17
Attending: PSYCHIATRY & NEUROLOGY
Payer: OTHER GOVERNMENT

## 2017-02-17 DIAGNOSIS — F43.10 PTSD (POST-TRAUMATIC STRESS DISORDER): ICD-10-CM

## 2017-02-17 PROCEDURE — G0176 OPPS/PHP;ACTIVITY THERAPY: HCPCS

## 2017-02-17 PROCEDURE — G0177 OPPS/PHP; TRAIN & EDUC SERV: HCPCS

## 2017-02-17 PROCEDURE — G0410 GRP PSYCH PARTIAL HOSP 45-50: HCPCS

## 2017-02-17 NOTE — BH THERAPY
" [facility]  Therapy Progress Note    Patient Name: Jerman Gilmore  Patient MRN: [id number]  Today's Date: 2/17/2017     Type of session:Individual psychotherapy  Length of session: 40  Persons in attendance:Patient    Subjective/New Info: individual session. Explored symptoms of PTSD; patient experiences many symptoms of PTSD but feels guilty having the diagnosis as he was not in combat. Discussed his shame and guilt at having a mental illness that keeps him from active combat duty. He feels unworthy of the diagnosis. Processed the underlying concerns and he agrees that journaling on these insights will be helpful.    Objective/Observations:   Participation: Active verbal participation, Attentive, Engaged and Open to feedback   Grooming: Casual and Neat   Cognition: Alert and Fully Oriented   Eye contact: Limited   Mood: Anxious   Affect: Flexible and Full range   Thought process: Logical and Goal-directed   Speech: Rate within normal limits and Volume within normal limits   Other:     Diagnoses:   1. PTSD (post-traumatic stress disorder)         Current risk:   SUICIDE: Low   Homicide: Low   Self-harm: Low   Relapse: Low   Other:    Safety Plan reviewed? Not Indicated   If evidence of imminent risk is present, intervention/plan:     Therapeutic Intervention(s): Clarify:  Clarify feelings, Clarify thoughts and Clarify values, Distress tolerance skills, Interpersonal effectiveness skills, Review treatment plan, Stressors assessed and Supportive psychotherapy    Treatment Goal(s)/Objective(s) addressed: ineffective coping     Progress toward Treatment Goals: Moderate improvement    Plan:  - Continue Partial Hospital Program  - \"Homework\" recommendation: journal about diagnosis  - Next appointment scheduled:  2/20/2017  - Decrease frequency of sessions  - Patient is in agreement with the above plan:  YES    Gwen Sneed R.N.  2/17/2017                                     "

## 2017-02-17 NOTE — BH THERAPY
Group Therapy Checklist  Attendance Duration (min): 46-60  Number of Participants: 3  Program/Group: Partial Hospital Program  Topics Covered: Boundaries  Participation: Attentive  Affect/Mood Range: Normal range, Flexible  Affect/Mood Display: Congruent w/content  Cognition: Alert, Oriented  Evidence of Imminent Suicide Risk: No  Evidence of imminent homicide risk: No  Therapeutic Interventions: Emotion clarification, Limit-setting  Progress Toward Treatment Goal: Mild improvement

## 2017-02-17 NOTE — BH THERAPY
Group Therapy Checklist  Attendance: Attended  Attendance Duration (min): Greater than 60  Number of Participants: 12  Program/Group: Partial Hospital Program  Topics Covered: Weekend planning  Participation: Active verbal participation (Shared that he is working on spending constructive time over the weekend as well as creating lesuire time.  Receptive to support from peers.)  Affect/Mood Range: Flexible, Normal range  Affect/Mood Display: Congruent w/content  Cognition: Oriented, Alert  Evidence of Imminent Suicide Risk: No  Evidence of imminent homicide risk: No  Therapeutic Interventions: Emotion clarification  Progress Toward Treatment Goal: Moderate improvement

## 2017-02-17 NOTE — BH THERAPY
Group Therapy Checklist  Attendance: Attended  Attendance Duration (min): 46-60  Number of Participants: 12  Program/Group: Partial Hospital Program  Topics Covered: Med aspects [place]  Participation: Attentive  Affect/Mood Range: Normal range, Flexible  Affect/Mood Display: Congruent w/content  Cognition: Alert, Oriented  Evidence of Imminent Suicide Risk: No  Evidence of imminent homicide risk: No  Therapeutic Interventions: Relapse prevention  Progress Toward Treatment Goal: Mild improvement

## 2017-02-21 ENCOUNTER — HOSPITAL ENCOUNTER (OUTPATIENT)
Dept: BEHAVIORAL HEALTH | Facility: MEDICAL CENTER | Age: 27
End: 2017-02-21
Attending: PSYCHIATRY & NEUROLOGY
Payer: OTHER GOVERNMENT

## 2017-02-21 VITALS
SYSTOLIC BLOOD PRESSURE: 120 MMHG | DIASTOLIC BLOOD PRESSURE: 72 MMHG | HEIGHT: 67 IN | WEIGHT: 160 LBS | RESPIRATION RATE: 16 BRPM | HEART RATE: 76 BPM | BODY MASS INDEX: 25.11 KG/M2 | TEMPERATURE: 97.9 F

## 2017-02-21 DIAGNOSIS — F41.1 GENERALIZED ANXIETY DISORDER: ICD-10-CM

## 2017-02-21 DIAGNOSIS — F33.0 MILD EPISODE OF RECURRENT MAJOR DEPRESSIVE DISORDER (HCC): ICD-10-CM

## 2017-02-21 PROCEDURE — G0176 OPPS/PHP;ACTIVITY THERAPY: HCPCS

## 2017-02-21 PROCEDURE — 99215 OFFICE O/P EST HI 40 MIN: CPT

## 2017-02-21 PROCEDURE — G0410 GRP PSYCH PARTIAL HOSP 45-50: HCPCS

## 2017-02-21 PROCEDURE — G0177 OPPS/PHP; TRAIN & EDUC SERV: HCPCS

## 2017-02-21 PROCEDURE — 99213 OFFICE O/P EST LOW 20 MIN: CPT | Performed by: PSYCHIATRY & NEUROLOGY

## 2017-02-21 NOTE — BH THERAPY
Group Therapy Checklist  Attendance: Attended  Attendance Duration (min): 46-60  Number of Participants: 3  Program/Group: Partial Hospital Program  Topics Covered: Journal writing  Participation: Active verbal participation  Affect/Mood Range: Normal range, Flexible  Affect/Mood Display: Congruent w/content  Cognition: Oriented, Alert  Evidence of Imminent Suicide Risk: No  Evidence of imminent homicide risk: No  Therapeutic Interventions: Emotion clarification  Progress Toward Treatment Goal: Moderate improvement

## 2017-02-21 NOTE — BH THERAPY
Group Therapy Checklist  Attendance: Attended  Attendance Duration (min): Greater than 60  Number of Participants: 3  Program/Group: Partial Hospital Program  Topics Covered: Other (Comment): (process group)  Participation: Active verbal participation, Attentive, Supportive to other group members, Open to feedback  Affect/Mood Range: Normal range, Flexible  Affect/Mood Display: Congruent w/content  Cognition: Alert, Oriented  Evidence of Imminent Suicide Risk: No  Evidence of imminent homicide risk: No  Therapeutic Interventions: Emotion clarification, Supportive psychotherapy  Progress Toward Treatment Goal: Moderate improvement  Jerman processed his weekend. He did some fun things, weather permitting, such as off roading, working out and video games. He is frustrated with little things, his own expectations. Processed loneliness and desire to have a relationship. Receptive to peer support.

## 2017-02-21 NOTE — CARE PLAN
"Problem: Ineffective Coping  Goal: Symptom reduction and improved functioning  Intervention: Partial Hospitalization Program  Attending three days weekly Tues, Wed, Thurs and working Monday and Friday for two weeks  Intervention: Psychiatric Evaluation and Management  Saw MD today; Rx refilled  Intervention: Recognizing and Replacing Self Defeating Thoughts  Encouraged to use this tool re: not deserving of \"warrior\" \"PTSD\" or other \"honor\" titles as he did not see combat.   Intervention: Anxiety Management  Practice ladder technique about anxiety of returning to work.           "

## 2017-02-21 NOTE — BH THERAPY
Group Therapy Checklist  Attendance: Attended  Attendance Duration (min): 46-60  Number of Participants: 2  Program/Group: Partial Hospital Program  Topics Covered: Anxiety Mgmt  Participation: Attentive  Affect/Mood Range: Normal range, Flexible  Affect/Mood Display: Congruent w/content  Cognition: Alert, Oriented  Evidence of Imminent Suicide Risk: No  Evidence of imminent homicide risk: No  Therapeutic Interventions: Distress tolerance skills  Progress Toward Treatment Goal: Mild improvement

## 2017-02-21 NOTE — BH THERAPY
" [facility]  Therapy Progress Note    Patient Name: Jerman Gilmore  Patient MRN: [id number]  Today's Date: 2/21/2017     Type of session:Individual psychotherapy  Length of session: 40  Persons in attendance:Patient    Subjective/New Info: individual session. Processed anxiety re: return to work. Jerman feels he doesn't deserve the \"medal, title, ...\" of being a warrior or  as he did not see combat; he doesn't feel he's earned PTSD due to lack of combat. Processed the deeper shame and guilt of mental health stigma and his own stigmatizing of self and others in the same boat. He intellectually is coming to terms but his core beliefs are deeper and more rooted. He continues to journal around the issues and to express his fears and guilt and shame in groups.     Objective/Observations:   Participation: Active verbal participation, Attentive, Engaged and Open to feedback   Grooming: Casual and Neat   Cognition: Alert and Fully Oriented   Eye contact: Limited   Mood: Anxious and Angry   Affect: Flexible, Full range, Congruent with content and Anxious   Thought process: Goal-directed   Speech: Rate within normal limits and Volume within normal limits   Other:     Diagnoses:   1. Generalized anxiety disorder         Current risk:   SUICIDE: Low   Homicide: Not applicable   Self-harm: Not applicable   Relapse: Not applicable   Other:    Safety Plan reviewed? Not Indicated   If evidence of imminent risk is present, intervention/plan:     Therapeutic Intervention(s): Clarify:  Clarify feelings, Clarify thoughts and Clarify values, Conflict resolution skills, Distress tolerance skills, Review treatment plan, Self-care skills, Stressors assessed and Supportive psychotherapy    Treatment Goal(s)/Objective(s) addressed: ineffective coping     Progress toward Treatment Goals: Mild improvement    Plan:  - Continue Partial Hospital Program  - Next appointment scheduled:  2/22/2017  - Patient is in agreement with the " above plan:  YES    Gwen Sneed R.N.  2/21/2017

## 2017-02-22 ENCOUNTER — HOSPITAL ENCOUNTER (OUTPATIENT)
Dept: BEHAVIORAL HEALTH | Facility: MEDICAL CENTER | Age: 27
End: 2017-02-22
Attending: PSYCHIATRY & NEUROLOGY
Payer: OTHER GOVERNMENT

## 2017-02-22 DIAGNOSIS — F43.10 PTSD (POST-TRAUMATIC STRESS DISORDER): ICD-10-CM

## 2017-02-22 PROCEDURE — G0176 OPPS/PHP;ACTIVITY THERAPY: HCPCS

## 2017-02-22 PROCEDURE — G0411 INTER ACTIVE GRP PSYCH PARTI: HCPCS

## 2017-02-22 PROCEDURE — G0177 OPPS/PHP; TRAIN & EDUC SERV: HCPCS

## 2017-02-22 NOTE — PROGRESS NOTES
"[facility]  PSYCHIATRIC FOLLOW-UP NOTE    Name: Jerman Gilmore  MRN: [id number]  : 1990  Age: 26 y.o.  Date of assessment: 2017  PCP: Kwesi Lange M.D.  Persons in attendance: Patient    REASON FOR VISIT/CHIEF COMPLAINT (as stated by Patient):  Jerman Gilmore is a 26 y.o., White male, attending follow-up appointment for   Chief Complaint   Patient presents with   • Depression     The patient is seen today for follow up on his depression, anxiety and insomnia.   .      HISTORY OF PRESENT ILLNESS:    The patient is seen today for follow up and he requested refills on his medications.    PSYCHOSOCIAL CHANGES SINCE PREVIOUS CONTACT:  The patient is anxious about stepping down and go back to work two days a week. 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      RESPONSE TO TREATMENT:  The patients depression, anxiety, and insomnia improved.     MEDICATION SIDE EFFECTS:  Denied.    REVIEW OF SYSTEMS:        Constitutional negative   Eyes negative   Ears/Nose/Mouth/Throat negative   Cardiovascular negative   Respiratory negative   Gastrointestinal negative   Genitourinary negative   Muscular negative   Integumentary negative   Neurological negative   Endocrine negative   Hematologic/Lymphatic negative       PSYCHIATRIC EXAMINATION/MENTAL STATUS  /72 mmHg  Pulse 76  Temp(Src) 36.6 °C (97.9 °F)  Resp 16  Ht 1.7 m (5' 6.93\")  Wt 72.576 kg (160 lb)  BMI 25.11 kg/m2  Participation: Active verbal participation, Attentive and Engaged  Grooming:Casual  Orientation: Alert and Fully Oriented  Eye contact: Good  Behavior:Calm   Mood: Anxious  Affect: Flexible and Full range  Thought process: Logical and Goal-directed  Thought content:  Within normal limits  Speech: Rate within normal limits and Volume within normal limits  Perception:  Within normal limits  Memory:  No gross evidence of memory deficits  Insight: Good  Judgment: Good  Family/couple " interaction observations:   Other:    Current risk:    Suicide: Low   Homicide: Low   Self-harm: Low  Relapse: Low  Other:   Crisis Safety Plan reviewed?Yes  If evidence of imminent risk is present, intervention/plan:    Medical Records/Labs/Diagnostic Tests Reviewed: yes.    Medical Records/Labs/Diagnostic Tests Ordered: none.    DIAGNOSTIC IMPRESSION(S):  1. Generalized anxiety disorder    2. Mild episode of recurrent major depressive disorder (CMS-[facility])           ASSESSMENT AND PLAN:  JULIO  Major depression    Continue same medications  Fluoxetine 40 mg per day # 30 refills two  Clonazepam 1 mg at night 30 refills two  Clonazepam 0.25 mg one in AM # 30 refills two  Trazodone 100 mg one at night # 30 refills two  Hydroxyzine 50 mg one at night # 30 refills two   Follow up in IOP.    More than 50% of face-to-face time in this 30 minute visit was spent in psychotherapy/counseling.    Topics addressed include:    Vineet Riojas M.D.

## 2017-02-22 NOTE — BH THERAPY
Group Therapy Checklist  Attendance: Attended  Attendance Duration (min): 46-60  Number of Participants: 1  Program/Group: Partial Hospital Program  Topics Covered: Healing Family within  Participation: Active verbal participation  Affect/Mood Range: Normal range, Flexible  Cognition: Alert, Oriented  Evidence of Imminent Suicide Risk: No  Evidence of imminent homicide risk: No  Therapeutic Interventions: Interpersonal effectiveness skills, Values clarification  Progress Toward Treatment Goal: Moderate improvement

## 2017-02-22 NOTE — BH THERAPY
"Group Therapy Checklist  Attendance: Attended  Attendance Duration (min): 46-60  Number of Participants: 7  Program/Group: Partial Hospital Program  Topics Covered: \"Pieces of Silence\"  Participation: Attentive  Affect/Mood Range: Normal range, Flexible  Affect/Mood Display: Congruent w/content  Cognition: Alert, Oriented  Evidence of Imminent Suicide Risk: No  Evidence of imminent homicide risk: No  Therapeutic Interventions: Psychoeducation re: (ACOA and codependency)  Progress Toward Treatment Goal: Mild improvement    "

## 2017-02-23 ENCOUNTER — HOSPITAL ENCOUNTER (OUTPATIENT)
Dept: BEHAVIORAL HEALTH | Facility: MEDICAL CENTER | Age: 27
End: 2017-02-23
Attending: PSYCHIATRY & NEUROLOGY
Payer: OTHER GOVERNMENT

## 2017-02-23 DIAGNOSIS — F41.1 GENERALIZED ANXIETY DISORDER: ICD-10-CM

## 2017-02-23 PROCEDURE — G0177 OPPS/PHP; TRAIN & EDUC SERV: HCPCS

## 2017-02-23 PROCEDURE — G0410 GRP PSYCH PARTIAL HOSP 45-50: HCPCS

## 2017-02-23 PROCEDURE — G0176 OPPS/PHP;ACTIVITY THERAPY: HCPCS

## 2017-02-23 NOTE — BH THERAPY
Group Therapy Checklist  Attendance: Attended  Attendance Duration (min): Greater than 60  Number of Participants: 4  Program/Group: Partial Hospital Program  Topics Covered: Other (Comment): (process group)  Participation: Active verbal participation, Attentive, Supportive to other group members, Open to feedback  Affect/Mood Range: Normal range, Flexible  Affect/Mood Display: Congruent w/content  Cognition: Alert, Oriented  Evidence of Imminent Suicide Risk: No  Evidence of imminent homicide risk: No  Therapeutic Interventions: Emotion clarification, Supportive psychotherapy  Progress Toward Treatment Goal: Mild improvement  Processed returning to work tomorrow and the anxiety of unknown and uncertainty and the scare of MH relapse. Receptive to peer support.

## 2017-02-23 NOTE — BH THERAPY
Group Therapy Checklist  Attendance: Attended  Attendance Duration (min): 46-60  Number of Participants: 4  Program/Group: Partial Hospital Program  Topics Covered: Dual Diagnosis  Participation: Active verbal participation, Attentive  Affect/Mood Range: Normal range, Flexible  Affect/Mood Display: Congruent w/content  Cognition: Alert, Oriented  Evidence of Imminent Suicide Risk: No  Evidence of imminent homicide risk: No  Therapeutic Interventions: Relapse prevention, Cognitive clarification  Progress Toward Treatment Goal: Moderate improvement

## 2017-02-23 NOTE — BH THERAPY
Group Therapy Checklist  Attendance: Attended  Attendance Duration (min): 46-60  Number of Participants: 2  Program/Group: Partial Hospital Program  Topics Covered: Goal setting  Participation: Active verbal participation  Affect/Mood Range: Normal range, Flexible  Affect/Mood Display: Anxious  Cognition: Alert, Oriented  Evidence of Imminent Suicide Risk: No  Evidence of imminent homicide risk: No  Therapeutic Interventions: Values clarification  Progress Toward Treatment Goal: Mild improvement

## 2017-02-24 ENCOUNTER — TELEPHONE (OUTPATIENT)
Dept: BEHAVIORAL HEALTH | Facility: MEDICAL CENTER | Age: 27
End: 2017-02-24

## 2017-02-24 ENCOUNTER — APPOINTMENT (OUTPATIENT)
Dept: BEHAVIORAL HEALTH | Facility: MEDICAL CENTER | Age: 27
End: 2017-02-24
Attending: PSYCHIATRY & NEUROLOGY
Payer: OTHER GOVERNMENT

## 2017-02-24 NOTE — BH THERAPY
Group Therapy Checklist  Attendance: Attended  Attendance Duration (min): 46-60  Number of Participants: 2  Program/Group: Partial Hospital Program  Topics Covered:  (activity, collage)  Participation: Active verbal participation, Attentive  Affect/Mood Range: Normal range, Flexible  Affect/Mood Display: Congruent w/content  Cognition: Alert, Oriented  Evidence of Imminent Suicide Risk: No  Evidence of imminent homicide risk: No  Therapeutic Interventions: Self-care skills, Play therapy  Progress Toward Treatment Goal: Significant improvement.  He did a collage of how his life will be after he goes back to work tomorrow and how he will integrate work with self care.

## 2017-02-24 NOTE — TELEPHONE ENCOUNTER
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    1. Client's Participation When in Attendance Was: Active in a Positive Way    2. Counselor's Evaluation of Client's Progress: Positive Movement    3. Patient is attending group and individual sessions and is progressing well toward the treatment goals: yes      YES NO   A. Relapse During Program []  [x]    B. Requires physician review []  [x]    C. Referral to program inappropriate []  [x]    D. Non compliance with Treatment Plan []  [x]    E. Early treatment termination (lack of attendance) []  [x]     []  [x]      Comments: Jerman is anxious about his RTW; processed it in groups. His family and superior officers are supportive of his efforts. Discharge planning with 3/3/17 as last PHP day anticipated.     Treatment Plan Review: - Continue Partial Hospital Program  - Next appointment scheduled:  2/28/2017  - Transition toward termination  - Patient is in agreement with the above plan:  YES

## 2017-02-28 ENCOUNTER — HOSPITAL ENCOUNTER (OUTPATIENT)
Dept: BEHAVIORAL HEALTH | Facility: MEDICAL CENTER | Age: 27
End: 2017-02-28
Attending: PSYCHIATRY & NEUROLOGY
Payer: OTHER GOVERNMENT

## 2017-02-28 VITALS
RESPIRATION RATE: 16 BRPM | HEIGHT: 67 IN | WEIGHT: 160 LBS | BODY MASS INDEX: 25.11 KG/M2 | DIASTOLIC BLOOD PRESSURE: 74 MMHG | SYSTOLIC BLOOD PRESSURE: 122 MMHG | HEART RATE: 78 BPM | TEMPERATURE: 98.1 F

## 2017-02-28 DIAGNOSIS — F41.1 GAD (GENERALIZED ANXIETY DISORDER): ICD-10-CM

## 2017-02-28 DIAGNOSIS — F33.0 MILD EPISODE OF RECURRENT MAJOR DEPRESSIVE DISORDER (HCC): ICD-10-CM

## 2017-02-28 PROCEDURE — G0176 OPPS/PHP;ACTIVITY THERAPY: HCPCS

## 2017-02-28 PROCEDURE — G0410 GRP PSYCH PARTIAL HOSP 45-50: HCPCS

## 2017-02-28 PROCEDURE — 99213 OFFICE O/P EST LOW 20 MIN: CPT | Performed by: PSYCHIATRY & NEUROLOGY

## 2017-02-28 PROCEDURE — 99215 OFFICE O/P EST HI 40 MIN: CPT

## 2017-02-28 PROCEDURE — G0177 OPPS/PHP; TRAIN & EDUC SERV: HCPCS

## 2017-02-28 NOTE — BH THERAPY
[facility]  Therapy Progress Note    Patient Name: Jerman Gilmore  Patient MRN: [id number]  Today's Date: 2/28/2017     Type of session:Individual psychotherapy  Length of session: 40  Persons in attendance:Patient    Subjective/New Info: individual session. Processed return to work, discharge planning especially scheduling around days off. Stressed the importance of routine and ritual and mental health. Jerman, his chief and this CM will process schedule at work on Thursday, time suggested 1500. Jerman reflected on coping skills that have benefitted him most especially wise mind concept, journaling and working with others. Encouraged to continue same.     Objective/Observations:   Participation: Active verbal participation, Attentive, Engaged and Open to feedback   Grooming: Casual and Neat   Cognition: Alert and Fully Oriented   Eye contact: Good   Mood: Euthymic   Affect: Flexible and Full range   Thought process: Logical and Goal-directed   Speech: Rate within normal limits and Volume within normal limits   Other:     Diagnoses:   1. Mild episode of recurrent major depressive disorder (CMS-HCC)    2. JULIO (generalized anxiety disorder)         Current risk:   SUICIDE: Not applicable   Homicide: Not applicable   Self-harm: Not applicable   Relapse: Not applicable   Other:    Safety Plan reviewed? Not Indicated   If evidence of imminent risk is present, intervention/plan:     Therapeutic Intervention(s): Clarify:  Clarify feelings, Clarify thoughts and Clarify values, Communication skills, Distress tolerance skills, Goal-setting, Review treatment plan and Supportive psychotherapy    Treatment Goal(s)/Objective(s) addressed: ineffective coping     Progress toward Treatment Goals: Moderate improvement    Plan:  - Continue Partial Hospital Program  - Next appointment scheduled:  3/1/2017  - Transition toward termination  - Patient is in agreement with the above plan:  YES    Gwen Sneed  DANIELLE  2/28/2017

## 2017-02-28 NOTE — BH THERAPY
Group Therapy Checklist  Attendance: Attended  Attendance Duration (min): 46-60  Number of Participants: 2  Program/Group: Partial Hospital Program  Topics Covered: Sleep Hygiene  Participation: Active verbal participation  Affect/Mood Range: Normal range, Flexible  Affect/Mood Display: Congruent w/content  Cognition: Alert, Oriented  Evidence of Imminent Suicide Risk: No  Evidence of imminent homicide risk: No  Therapeutic Interventions: Cognitive clarification, Self-care skills  Progress Toward Treatment Goal: Moderate improvement

## 2017-02-28 NOTE — BH THERAPY
Group Therapy Checklist  Attendance: Attended  Attendance Duration (min): 46-60  Number of Participants: 4  Program/Group: Intensive Outpatient Program  Topics Covered: Grief & Loss  Participation: Active verbal participation, Attentive  Affect/Mood Range: Normal range, Flexible  Affect/Mood Display: Congruent w/content  Cognition: Oriented, Alert  Evidence of Imminent Suicide Risk: No  Evidence of imminent homicide risk: No  Therapeutic Interventions: Values clarification  Progress Toward Treatment Goal: Moderate improvement

## 2017-02-28 NOTE — BH THERAPY
Group Therapy Checklist  Attendance: Attended  Attendance Duration (min): Greater than 60  Number of Participants: 5  Program/Group: Partial Hospital Program  Topics Covered: Other (Comment): (process group)  Participation: Active verbal participation, Attentive, Supportive to other group members, Open to feedback  Affect/Mood Range: Normal range, Flexible  Affect/Mood Display: Congruent w/content  Cognition: Alert, Oriented  Evidence of Imminent Suicide Risk: No  Evidence of imminent homicide risk: No  Therapeutic Interventions: Emotion clarification, Supportive psychotherapy  Progress Toward Treatment Goal: Moderate improvement  Jerman processed his return to work; his biggest fears did not happen. He enjoyed being at work, felt co-workers were respectful and supportive. He processed how the experience might be helpful with future endeavors. Receptive to peer support.

## 2017-03-01 ENCOUNTER — HOSPITAL ENCOUNTER (OUTPATIENT)
Dept: BEHAVIORAL HEALTH | Facility: MEDICAL CENTER | Age: 27
End: 2017-03-01
Attending: PSYCHIATRY & NEUROLOGY
Payer: OTHER GOVERNMENT

## 2017-03-01 DIAGNOSIS — F41.1 GENERALIZED ANXIETY DISORDER: ICD-10-CM

## 2017-03-01 PROCEDURE — G0176 OPPS/PHP;ACTIVITY THERAPY: HCPCS

## 2017-03-01 PROCEDURE — G0177 OPPS/PHP; TRAIN & EDUC SERV: HCPCS

## 2017-03-01 PROCEDURE — G0410 GRP PSYCH PARTIAL HOSP 45-50: HCPCS

## 2017-03-01 NOTE — BH THERAPY
Group Therapy Checklist  Attendance: Attended  Attendance Duration (min): 46-60  Number of Participants: 1  Program/Group: Partial Hospital Program  Topics Covered: Other (Comment): (creative art)  Participation: Active verbal participation, Attentive  Affect/Mood Range: Normal range, Flexible  Affect/Mood Display: Congruent w/content  Cognition: Alert, Oriented  Evidence of Imminent Suicide Risk: No  Evidence of imminent homicide risk: No  Therapeutic Interventions: Socialization, Leisure and Recreation skills  Progress Toward Treatment Goal: Moderate improvement

## 2017-03-01 NOTE — BH THERAPY
Group Therapy Checklist  Attendance: Attended  Attendance Duration (min): 46-60  Number of Participants: 9  Program/Group: Partial Hospital Program  Topics Covered: Cognitive distortions  Participation: Active verbal participation, Attentive  Affect/Mood Range: Normal range, Flexible  Affect/Mood Display: Congruent w/content  Cognition: Alert, Oriented  Evidence of Imminent Suicide Risk: No  Evidence of imminent homicide risk: No  Therapeutic Interventions: Cognitive clarification, Values clarification  Progress Toward Treatment Goal: Moderate improvement

## 2017-03-01 NOTE — BH THERAPY
Group Therapy Checklist  Attendance: Attended  Attendance Duration (min): 46-60  Number of Participants: 1  Program/Group: Partial Hospital Program  Topics Covered: Pain vs. Suffering  Participation: Active verbal participation, Attentive  Affect/Mood Range: Normal range, Flexible  Affect/Mood Display: Congruent w/content  Cognition: Alert, Oriented  Evidence of Imminent Suicide Risk: No  Evidence of imminent homicide risk: No  Therapeutic Interventions: Psychoeducation re: suffering  Progress Toward Treatment Goal: Moderate improvement

## 2017-03-01 NOTE — PROGRESS NOTES
"[facility]  PSYCHIATRIC FOLLOW-UP NOTE    Name: Jerman Gilmore  MRN: [id number]  : 1990  Age: 26 y.o.  Date of assessment: 2017  PCP: Kwesi Lange M.D.  Persons in attendance: Patient    REASON FOR VISIT/CHIEF COMPLAINT (as stated by Patient):  Jerman Gilmore is a 26 y.o., White male, attending follow-up appointment for   Chief Complaint   Patient presents with   • Depression     The patient is seen today for follow up and his depression improved.   .      HISTORY OF PRESENT ILLNESS:    This is a 27 yo male, seen today for follow up, and he is anxious about going back to work. The patient reported that he has the tools to use and he feels more confident.    PSYCHOSOCIAL CHANGES SINCE PREVIOUS CONTACT:  Depression and anxiety are improving with medications.    RESPONSE TO TREATMENT:  Good.    MEDICATION SIDE EFFECTS:  Denied.    REVIEW OF SYSTEMS:        Constitutional negative   Eyes negative   Ears/Nose/Mouth/Throat negative   Cardiovascular negative   Respiratory negative   Gastrointestinal negative   Genitourinary negative   Muscular negative   Integumentary negative   Neurological negative   Endocrine negative   Hematologic/Lymphatic negative       PSYCHIATRIC EXAMINATION/MENTAL STATUS  /74 mmHg  Pulse 78  Temp(Src) 36.7 °C (98.1 °F)  Resp 16  Ht 1.7 m (5' 6.93\")  Wt 72.576 kg (160 lb)  BMI 25.11 kg/m2  Participation: Active verbal participation, Attentive and Engaged  Grooming:Casual  Orientation: Alert and Fully Oriented  Eye contact: Good  Behavior:Calm   Mood: Anxious  Affect: Flexible and Full range  Thought process: Logical and Goal-directed  Thought content:  Within normal limits  Speech: Rate within normal limits and Volume within normal limits  Perception:  Within normal limits  Memory:  No gross evidence of memory deficits  Insight: Good  Judgment: Good  Family/couple interaction observations:   Other:    Current risk:    Suicide: Low   Homicide: " Low   Self-harm: Low  Relapse: Low  Other:   Crisis Safety Plan reviewed?Yes  If evidence of imminent risk is present, intervention/plan:    Medical Records/Labs/Diagnostic Tests Reviewed: yes.    Medical Records/Labs/Diagnostic Tests Ordered: none.    DIAGNOSTIC IMPRESSION(S):  1. Mild episode of recurrent major depressive disorder (CMS-HCC)    2. JULIO (generalized anxiety disorder)           ASSESSMENT AND PLAN:  Major depression  JULIO    Continue same medications.  Fluoxetine 40 mg per day  Clonazepam 1 mg at night  Trazodone 100 mg at night  Hydroxyzine 50 mg at night  Clonazepam 0.25 mg per day    More than 50% of face-to-face time in this 30 minute visit was spent in psychotherapy/counseling.    Topics addressed include:  The patient is going back to work this weekend for two days.  Keep good sleep hygiene      Vineet Riojas M.D.

## 2017-03-01 NOTE — BH THERAPY
Group Therapy Checklist  Attendance: Attended  Attendance Duration (min): Greater than 60  Number of Participants: 9  Program/Group: Partial Hospital Program  Topics Covered: Other (Comment): (process group)  Participation: Active verbal participation, Attentive, Supportive to other group members, Open to feedback  Affect/Mood Range: Normal range, Flexible  Affect/Mood Display: Congruent w/content  Cognition: Alert, Oriented  Evidence of Imminent Suicide Risk: No  Evidence of imminent homicide risk: No  Therapeutic Interventions: Emotion clarification, Supportive psychotherapy  Progress Toward Treatment Goal: Moderate improvement  Jerman processed returning to work last week with group. He states this is his last week of PHP and processed the anxiety of leaving and the determination to use his tools for continued recovery from anxiety and depression. He feels he is with a supportive group of co-workers which has been a big relief. He is optimistic about the future even in its uncertainty. He states he prays nightly. Receptive to peer support.

## 2017-03-02 ENCOUNTER — HOSPITAL ENCOUNTER (OUTPATIENT)
Dept: BEHAVIORAL HEALTH | Facility: MEDICAL CENTER | Age: 27
End: 2017-03-02
Attending: PSYCHIATRY & NEUROLOGY
Payer: OTHER GOVERNMENT

## 2017-03-02 DIAGNOSIS — F41.1 GENERALIZED ANXIETY DISORDER: ICD-10-CM

## 2017-03-02 PROCEDURE — G0176 OPPS/PHP;ACTIVITY THERAPY: HCPCS

## 2017-03-02 PROCEDURE — G0177 OPPS/PHP; TRAIN & EDUC SERV: HCPCS

## 2017-03-02 PROCEDURE — G0410 GRP PSYCH PARTIAL HOSP 45-50: HCPCS

## 2017-03-02 NOTE — BH THERAPY
Group Therapy Checklist  Attendance: Attended  Attendance Duration (min): 46-60  Number of Participants: 7  Program/Group: Partial Hospital Program  Topics Covered: Spirituality  Participation: Active verbal participation, Attentive  Affect/Mood Range: Normal range, Flexible  Affect/Mood Display: Congruent w/content  Cognition: Alert, Oriented  Evidence of Imminent Suicide Risk: No  Evidence of imminent homicide risk: No  Therapeutic Interventions: Cognitive clarification, Values clarification  Progress Toward Treatment Goal: Mild improvement

## 2017-03-02 NOTE — BH THERAPY
Group Therapy Checklist  Attendance: Attended  Attendance Duration (min): Greater than 60  Number of Participants: 7  Program/Group: Partial Hospital Program  Topics Covered: Other (Comment): (process group)  Participation: Active verbal participation, Attentive, Supportive to other group members, Open to feedback  Affect/Mood Range: Normal range, Flexible  Affect/Mood Display: Congruent w/content  Cognition: Alert, Oriented  Evidence of Imminent Suicide Risk: No  Evidence of imminent homicide risk: No  Therapeutic Interventions: Emotion clarification, Supportive psychotherapy  Progress Toward Treatment Goal: Moderate improvement  Jerman said farewell to the morning group; its his last day. He reflected on the PHP experience as a whole and shared his appreciation for the group. Receptive to peer support and goodbyes.

## 2017-03-02 NOTE — CARE PLAN
Problem: Ineffective Coping  Goal: Symptom reduction and improved functioning  Intervention: Relapse Prevention Plan  Last day, starts Monday evening group 5:15-6:15 and returns to work.

## 2017-03-02 NOTE — BH THERAPY
Group Therapy Checklist  Attendance: Attended  Attendance Duration (min): 46-60  Number of Participants: 2  Topics Covered: Regulating emotions  Participation: Active verbal participation  Affect/Mood Range: Normal range, Flexible  Affect/Mood Display: Congruent w/content  Cognition: Alert, Oriented  Evidence of Imminent Suicide Risk: No  Evidence of imminent homicide risk: No  Therapeutic Interventions: Emotion clarification, Mindfulness exercise  Progress Toward Treatment Goal: Moderate improvement

## 2017-03-02 NOTE — BH THERAPY
Group Therapy Checklist  Attendance: Attended  Attendance Duration (min): 46-60  Number of Participants: 2  Program/Group: Partial Hospital Program  Topics Covered:  (activity, meditation)  Participation: Limited verbal participation, Attentive  Affect/Mood Range: Normal range, Flexible  Affect/Mood Display: Congruent w/content  Cognition: Alert, Oriented  Evidence of Imminent Suicide Risk: No  Evidence of imminent homicide risk: No  Therapeutic Interventions: Mindfulness exercise  Progress Toward Treatment Goal: Significant improvement.

## 2017-03-03 ENCOUNTER — APPOINTMENT (OUTPATIENT)
Dept: BEHAVIORAL HEALTH | Facility: MEDICAL CENTER | Age: 27
End: 2017-03-03
Attending: PSYCHIATRY & NEUROLOGY
Payer: OTHER GOVERNMENT

## 2017-03-03 ENCOUNTER — TELEPHONE (OUTPATIENT)
Dept: BEHAVIORAL HEALTH | Facility: MEDICAL CENTER | Age: 27
End: 2017-03-03

## 2017-03-03 NOTE — TELEPHONE ENCOUNTER
[facility]  TRANSFER/DISCHARGE SUMMARY FORM    HHPI / SCP: no Other Ins.:      Patient Name: Jerman Gilmore  Admission Date: 2017  Level of Care Attended:  Partial Hosp : 1990  Transfer/Discharge Date: MRN: [id number]  3/3/2017       SIGNIFICANT FINDINGS/CLINICAL IMPRESSION:   DSM Codes:   F33.2, MDD; F41.1, JULIO and F43.0 PTSD    ICD10 Codes:   No diagnosis found.    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     Treatment Components in Which Patient Participated (check all that apply):  Education group(s), 1:1 teaching/therapy, Medication Management and Group Therapy    Summary of Course of Treatment: Jerman actively participated in all PHP groups and activities; he was supportive of peers.     Condition at Time of Transfer/Discharge: Met treatment goals.    [] Medications Reviewed with Copy to Patient    Referred to: Refer to Community support program: OP therapist, Nika Gordon, Primary Care Physician and [facility] : Monday evening weekly process group.     Patient is in agreement with discharge plan: yes    Gwen Sneed R.N.

## 2017-03-06 ENCOUNTER — NON-PROVIDER VISIT (OUTPATIENT)
Dept: BEHAVIORAL HEALTH | Facility: PHYSICIAN GROUP | Age: 27
End: 2017-03-06
Payer: OTHER GOVERNMENT

## 2017-03-06 PROCEDURE — 90853 GROUP PSYCHOTHERAPY: CPT | Performed by: SOCIAL WORKER

## 2017-03-07 NOTE — BH THERAPY
Behavioral Health  Group Therapy Progress Note    Name: Jerman Gilmore  MRN: [id number]  Date: 3/6/2017    Attendance: Attended   Attendance Duration (min): 46-60  Number of Participants: 4     Program/Group: Other (Comment) (Skills Group)  Topics Covered: Other (Comment): (Skills for Reducing Anxiety & Depression)  Participation: Active verbal participation (Shared that working out helps his anxiety & depression.  Found counting down his anxiety helpful.)  Affect/Mood Range: Normal range  Affect/Mood Display: Congruent w/content  Cognition: Alert, Oriented  Evidence of Imminent Suicide Risk: No  Evidence of imminent homicide risk: No  Therapeutic Interventions: Psychoeducation re: (Comment), Relaxationexercise (Ways to reduce depression & anxiety)  Progress Toward Treatment Goal: Moderate improvement        Treatment Plan: - Continue Group therapy, Medication management and Intensive Outpatient Program     Yasmeen Albarran MSW

## 2017-03-13 ENCOUNTER — NON-PROVIDER VISIT (OUTPATIENT)
Dept: BEHAVIORAL HEALTH | Facility: PHYSICIAN GROUP | Age: 27
End: 2017-03-13
Payer: OTHER GOVERNMENT

## 2017-03-13 PROCEDURE — 90853 GROUP PSYCHOTHERAPY: CPT | Performed by: SOCIAL WORKER

## 2017-03-14 NOTE — BH THERAPY
Behavioral Health  Group Therapy Progress Note    Name: Jerman Gilmore  MRN: [id number]  Date: 3/13/2017    Attendance: Attended   Attendance Duration (min): Greater than 60  Number of Participants: 5     Program/Group: Other (Comment) (Skills Group)  Topics Covered: Stress Management, Belief Systems, Depression Recovery, Caring for Ourselves, Other (Comment): (How to challenge negative thoughts, set goals)  Participation: Active verbal participation, Attentive, Supportive to other group members, Open to feedback.  Said he is doing well, getting better at setting limits.  Affect/Mood Range: Normal range, Flexible  Affect/Mood Display: Congruent w/content (PHQ: 7)  Cognition: Alert, Oriented, Other (Comment)  Evidence of Imminent Suicide Risk: No  Evidence of imminent homicide risk: No  Therapeutic Interventions: Emotion clarification, Cognitive clarification, Self-care skills  Progress Toward Treatment Goal: Mild improvement        Treatment Plan: - Continue Group therapy and Medication management     CRISTÓBAL Davis

## 2017-03-20 ENCOUNTER — NON-PROVIDER VISIT (OUTPATIENT)
Dept: BEHAVIORAL HEALTH | Facility: PHYSICIAN GROUP | Age: 27
End: 2017-03-20
Payer: OTHER GOVERNMENT

## 2017-03-20 PROCEDURE — 90853 GROUP PSYCHOTHERAPY: CPT | Performed by: SOCIAL WORKER

## 2017-03-21 NOTE — BH THERAPY
"Behavioral Health  Group Therapy Progress Note    Name: Jerman Gilmore  MRN: [id number]  Date: 3/20/2017    Attendance: Attended   Attendance Duration (min): 46-60  Number of Participants: 4     Program/Group: Other (Comment) (Skills Group)  Topics Covered: Letting Go, Stress Management, Anxiety Mgmt, Depression Recovery, Mindful practice  Participation: Active verbal participation, Attentive, Open to feedback, Other (Comment) (Talked about some apprehension re: new job, but feeling more hopeful, able to let go of the anxiety and \"go with the flow\".  Working to maintain good sleep hygiene.)  Affect/Mood Range: Normal range, Flexible  Affect/Mood Display: Congruent w/content  Cognition: Alert, Oriented  Evidence of Imminent Suicide Risk: No  Evidence of imminent homicide risk: No  Therapeutic Interventions: Cognitive modification, Relaxationexercise, Supportive psychotherapy, Other (Comment) (Exercise to let go of unhelpful beliefs.)  Progress Toward Treatment Goal: Moderate improvement        Treatment Plan: - Continue Group therapy and Medication management     CRISTÓBAL Davis             "

## 2017-03-27 ENCOUNTER — OFFICE VISIT (OUTPATIENT)
Dept: BEHAVIORAL HEALTH | Facility: PHYSICIAN GROUP | Age: 27
End: 2017-03-27
Payer: OTHER GOVERNMENT

## 2017-03-27 DIAGNOSIS — F41.1 GENERALIZED ANXIETY DISORDER: ICD-10-CM

## 2017-03-27 PROCEDURE — 90853 GROUP PSYCHOTHERAPY: CPT | Performed by: SOCIAL WORKER

## 2017-03-27 NOTE — MR AVS SNAPSHOT
Jerman Gilmore   3/27/2017 5:30 PM   Office Visit   MRN: [id number]    Department:  [facility]   Dept Phone:  [phone]    Description:  Male : 1990   Provider:  CRISTÓBAL Davis           Allergies as of 3/27/2017     No Known Allergies      You were diagnosed with     Generalized anxiety disorder   [300.02.ICD-9-CM]         Vital Signs     Smoking Status                   Former Smoker           Basic Information     Date Of Birth Sex Race Ethnicity Preferred Language    1990 Male White Non- English      Problem List              ICD-10-CM Priority Class Noted - Resolved    Hypovitaminosis D E55.9   2015 - Present      Health Maintenance        Date Due Completion Dates    IMM HEP A VACCINE (1 of 2 - Standard Series) 1991 ---    IMM DTaP/Tdap/Td Vaccine (6 - Tdap) 2001 10/3/1994, 1/10/1992, 1991, 1990, 1990    IMM VARICELLA (CHICKENPOX) VACCINE (1 of 2 - 2 Dose Adolescent Series) 2003 ---    IMM INFLUENZA (1) 2016 ---            Current Immunizations     Dtap Vaccine 10/3/1994, 1/10/1992, 1991, 1990, 1990    Hepatitis B Vaccine Non-Recombivax (Ped/Adol) 1993, 1993, 1993    Hib Vaccine (Prp-d) Historical Data 10/11/1991, 1991, 1990    MMR Vaccine 10/3/1994, 10/11/1991    OPV - Historical Data 10/3/1994, 1/10/1992, 1990, 1990      Below and/or attached are the medications your provider expects you to take. Review all of your home medications and newly ordered medications with your provider and/or pharmacist. Follow medication instructions as directed by your provider and/or pharmacist. Please keep your medication list with you and share with your provider. Update the information when medications are discontinued, doses are changed, or new medications (including over-the-counter products) are added; and carry medication information at all times in the event of emergency situations      Allergies:  No Known Allergies          Medications  Valid as of: March 28, 2017 -  8:02 AM    Generic Name Brand Name Tablet Size Instructions for use    Cholecalciferol (Cap) Vitamin D3 2000 UNIT Take 1 Cap by mouth every day.        ClonazePAM (Tab) KLONOPIN 1 MG Take 0.5 mg by mouth 2 times a day.        ClonazePAM (Tab) KLONOPIN 1 MG Take 1 mg by mouth 1 time daily as needed (at night).        FLUoxetine HCl (Cap) PROZAC 20 MG Take 40 mg by mouth every day.        HydrOXYzine HCl (Solution) VISTARIL 50 MG/ML 50 mg by Intramuscular route every 6 hours as needed.        Mirtazapine (Tab) REMERON 15 MG Take 1 Tab by mouth every bedtime.        Temazepam (Cap) RESTORIL 15 MG Take 1 Cap by mouth at bedtime as needed for Sleep.        TraZODone HCl (Tab) DESYREL 100 MG Take 100 mg by mouth every evening.        .                 Medicines prescribed today were sent to:     [id number] - [place] - [address] [place] [address]    Phone: [phone] Fax: [phone]    Open 24 Hours?: No      Medication refill instructions:       If your prescription bottle indicates you have medication refills left, it is not necessary to call your provider’s office. Please contact your pharmacy and they will refill your medication.    If your prescription bottle indicates you do not have any refills left, you may request refills at any time through one of the following ways: The online [employer] system (except Urgent Care), by calling your provider’s office, or by asking your pharmacy to contact your provider’s office with a refill request. Medication refills are processed only during regular business hours and may not be available until the next business day. Your provider may request additional information or to have a follow-up visit with you prior to refilling your medication.   *Please Note: Medication refills are assigned a new Rx number when refilled electronically. Your pharmacy may indicate that no  refills were authorized even though a new prescription for the same medication is available at the pharmacy. Please request the medicine by name with the pharmacy before contacting your provider for a refill.           [employer] Access Code: Activation code not generated  Current [employer] Status: Active

## 2017-03-28 NOTE — BH THERAPY
Behavioral Health  Group Therapy Progress Note    Name: Jerman Gilmore  MRN: [id number]  Date: 3/27/2017    Attendance: Attended   Attendance Duration (min): Greater than 60  Number of Participants: 4     Program/Group: Other (Comment) (Skills Group)  Topics Covered: Time Management, Stress Management, Caring for Ourselves, Acceptance  Participation: Active verbal participation, Supportive to other group members, Other (Comment) (Feels he is doing better with his anxiety, especially as has consistent work hours & sleep.)  Affect/Mood Range: Normal range, Flexible  Affect/Mood Display: Congruent w/content  Cognition: Alert, Oriented  Evidence of Imminent Suicide Risk: No  Evidence of imminent homicide risk: No  Therapeutic Interventions: Problem-solving, Self-care skills, Supportive psychotherapy  Progress Toward Treatment Goal: Moderate improvement        Treatment Plan: - Continue Group therapy and Medication management     CRISTÓBAL Davis

## 2017-04-10 ENCOUNTER — OFFICE VISIT (OUTPATIENT)
Dept: BEHAVIORAL HEALTH | Facility: PHYSICIAN GROUP | Age: 27
End: 2017-04-10
Payer: OTHER GOVERNMENT

## 2017-04-10 DIAGNOSIS — F41.1 GENERALIZED ANXIETY DISORDER: ICD-10-CM

## 2017-04-10 PROCEDURE — 90853 GROUP PSYCHOTHERAPY: CPT | Performed by: SOCIAL WORKER

## 2017-04-11 NOTE — BH THERAPY
Behavioral Health  Group Therapy Progress Note    Name: Jerman Gilmore  MRN: [id number]  Date: 4/10/2017    Attendance: Attended   Attendance Duration (min): 46-60  Number of Participants: 8     Program/Group: Other (Comment) (Monday Skills Group)  Topics Covered: Self Affirmation, Acceptance, Anxiety Mgmt, Depression Recovery, Caring for Ourselves  Participation: Active verbal participation, Supportive to other group members, Attentive, Other (Comment) (Shared that he continues to do well with sleep, moods, enjoying  job.)  Affect/Mood Range: Normal range, Flexible  Affect/Mood Display: Congruent w/content     Evidence of Imminent Suicide Risk: No  Evidence of imminent homicide risk: No  Therapeutic Interventions: Emotion clarification, Cognitive clarification, Values clarification, Supportive psychotherapy  Progress Toward Treatment Goal: Moderate improvement        Treatment Plan: - Continue Group therapy and Medication management     Yasmeen Albarran MSW

## 2017-04-18 RX ORDER — TRAZODONE HYDROCHLORIDE 100 MG/1
TABLET ORAL
Qty: 30 TAB | Refills: 1 | Status: SHIPPED | OUTPATIENT
Start: 2017-04-18 | End: 2017-11-28

## 2017-04-18 RX ORDER — FLUOXETINE HYDROCHLORIDE 40 MG/1
CAPSULE ORAL
Qty: 30 CAP | Refills: 1 | Status: SHIPPED | OUTPATIENT
Start: 2017-04-18 | End: 2020-08-11

## 2017-05-25 ENCOUNTER — OFFICE VISIT (OUTPATIENT)
Dept: BEHAVIORAL HEALTH | Facility: PHYSICIAN GROUP | Age: 27
End: 2017-05-25
Payer: OTHER GOVERNMENT

## 2017-05-25 VITALS
HEIGHT: 67 IN | HEART RATE: 78 BPM | WEIGHT: 159 LBS | TEMPERATURE: 98.4 F | BODY MASS INDEX: 24.96 KG/M2 | DIASTOLIC BLOOD PRESSURE: 76 MMHG | RESPIRATION RATE: 14 BRPM | SYSTOLIC BLOOD PRESSURE: 124 MMHG

## 2017-05-25 DIAGNOSIS — F41.1 GAD (GENERALIZED ANXIETY DISORDER): ICD-10-CM

## 2017-05-25 DIAGNOSIS — F33.0 MILD EPISODE OF RECURRENT MAJOR DEPRESSIVE DISORDER (HCC): ICD-10-CM

## 2017-05-25 PROCEDURE — 99213 OFFICE O/P EST LOW 20 MIN: CPT | Performed by: PSYCHIATRY & NEUROLOGY

## 2017-05-25 RX ORDER — CLONAZEPAM 0.25 MG/1
0.25 TABLET, ORALLY DISINTEGRATING ORAL DAILY
Qty: 30 TAB | Refills: 1 | Status: SHIPPED | OUTPATIENT
Start: 2017-05-25 | End: 2020-08-11

## 2017-05-25 RX ORDER — CLONAZEPAM 1 MG/1
1 TABLET ORAL
Qty: 30 TAB | Refills: 1 | Status: SHIPPED | OUTPATIENT
Start: 2017-05-25 | End: 2020-08-11

## 2017-05-25 RX ORDER — TRAZODONE HYDROCHLORIDE 100 MG/1
100 TABLET ORAL
Qty: 30 TAB | Refills: 5 | Status: SHIPPED | OUTPATIENT
Start: 2017-05-25 | End: 2017-11-28 | Stop reason: SDUPTHER

## 2017-05-25 RX ORDER — HYDROXYZINE 50 MG/1
50 TABLET, FILM COATED ORAL
Qty: 30 TAB | Refills: 5 | Status: SHIPPED | OUTPATIENT
Start: 2017-05-25 | End: 2017-11-28 | Stop reason: SDUPTHER

## 2017-05-25 RX ORDER — FLUOXETINE HYDROCHLORIDE 40 MG/1
40 CAPSULE ORAL DAILY
Qty: 30 CAP | Refills: 5 | Status: SHIPPED | OUTPATIENT
Start: 2017-05-25 | End: 2017-12-18 | Stop reason: SDUPTHER

## 2017-05-25 NOTE — PROGRESS NOTES
"[facility]  PSYCHIATRIC FOLLOW-UP NOTE    Name: Jerman Gilmore  MRN: [id number]  : 1990  Age: 26 y.o.  Date of assessment: 2017  PCP: Kwesi Lange M.D.  Persons in attendance: Patient  Total face-to-face time: 30 minutes    REASON FOR VISIT/CHIEF COMPLAINT (as stated by Patient):  Jerman Gilmore is a 26 y.o., White male, attending follow-up appointment for   Chief Complaint   Patient presents with   • Anxiety     The patient is seen today for follow up on his depression and anxiety and he requested refills on his medications.   .      HISTORY OF PRESENT ILLNESS:    This is a 27 yo male, single, employed, seen today for follow up on his depression, anxiety, and insomnia. The patient is back to work and he is working day shift and he was able to sleep at night. The patient has been staying with his parents and he is less stressed and not that anxious. The patient has been  taking clonazepam 1 mg at night with hydroxyzine 50 mg at night for sleep. The patient is taking fluoxetine 40 mg in AM and trazodone 100 mg at night.    PSYCHOSOCIAL CHANGES SINCE PREVIOUS CONTACT:  The patient denied depression and he is sleeping well.    RESPONSE TO TREATMENT:  Good.    MEDICATION SIDE EFFECTS:  Denied.    REVIEW OF SYSTEMS:        Constitutional negative   Eyes negative   Ears/Nose/Mouth/Throat negative   Cardiovascular negative   Respiratory negative   Gastrointestinal negative   Genitourinary negative   Muscular negative   Integumentary negative   Neurological negative   Endocrine negative   Hematologic/Lymphatic negative       PSYCHIATRIC EXAMINATION/MENTAL STATUS  /76 mmHg  Pulse 78  Temp(Src) 36.9 °C (98.4 °F)  Resp 14  Ht 1.702 m (5' 7.01\")  Wt 72.122 kg (159 lb)  BMI 24.90 kg/m2  Participation: Active verbal participation and Attentive  Grooming:Casual  Orientation: Alert and Fully Oriented  Eye contact: Good  Behavior:Calm   Mood: Anxious  Affect: Anxious  Thought process: Logical " and Goal-directed  Thought content:  Within normal limits  Speech: Rate within normal limits and Volume within normal limits  Perception:  Within normal limits  Memory:  No gross evidence of memory deficits  Insight: Good  Judgment: Good  Family/couple interaction observations:   Other:    Current risk:    Suicide: Low   Homicide: Low   Self-harm: Low  Relapse: Low  Other:   Crisis Safety Plan reviewed?Yes  If evidence of imminent risk is present, intervention/plan:    Medical Records/Labs/Diagnostic Tests Reviewed: yes.    Medical Records/Labs/Diagnostic Tests Ordered: none.    DIAGNOSTIC IMPRESSION(S):  1. Mild episode of recurrent major depressive disorder (CMS-HCC)    2. JULIO (generalized anxiety disorder)           ASSESSMENT AND PLAN:  Major depression  JULIO.    Fluoxetine 40 mg per day # 30 refills five  Trazodone 100 mg one at night # 30 refills five  Hydroxyzine 50 mg one at night # 30 refills five.  Clonazepam 1 mg one at night # 30 refills one   Clonazepam 0.25 mg one a day # 30 refills one    Follow up in two months.    16 minutes were spent in psychotherapy.  (If greater than 16 minutes, add 94809 code)   Topics addressed in psychotherapy include:    Vineet Riojas M.D.

## 2017-07-25 ENCOUNTER — OFFICE VISIT (OUTPATIENT)
Dept: BEHAVIORAL HEALTH | Facility: PHYSICIAN GROUP | Age: 27
End: 2017-07-25
Payer: OTHER GOVERNMENT

## 2017-07-25 VITALS
RESPIRATION RATE: 14 BRPM | BODY MASS INDEX: 24.8 KG/M2 | TEMPERATURE: 98.1 F | WEIGHT: 158 LBS | HEIGHT: 67 IN | DIASTOLIC BLOOD PRESSURE: 70 MMHG | HEART RATE: 76 BPM | SYSTOLIC BLOOD PRESSURE: 126 MMHG

## 2017-07-25 DIAGNOSIS — F33.0 MILD EPISODE OF RECURRENT MAJOR DEPRESSIVE DISORDER (HCC): ICD-10-CM

## 2017-07-25 DIAGNOSIS — F41.1 GAD (GENERALIZED ANXIETY DISORDER): ICD-10-CM

## 2017-07-25 PROCEDURE — 99213 OFFICE O/P EST LOW 20 MIN: CPT | Performed by: PSYCHIATRY & NEUROLOGY

## 2017-07-25 PROCEDURE — 90833 PSYTX W PT W E/M 30 MIN: CPT | Performed by: PSYCHIATRY & NEUROLOGY

## 2017-07-25 RX ORDER — CLONAZEPAM 0.25 MG/1
0.25 TABLET, ORALLY DISINTEGRATING ORAL
Qty: 30 TAB | Refills: 1 | Status: SHIPPED
Start: 2017-07-25 | End: 2017-10-24 | Stop reason: SDUPTHER

## 2017-07-25 RX ORDER — CLONAZEPAM 1 MG/1
1 TABLET ORAL
Qty: 30 TAB | Refills: 1 | Status: SHIPPED
Start: 2017-07-25 | End: 2017-10-24 | Stop reason: SDUPTHER

## 2017-07-25 NOTE — PROGRESS NOTES
"[facility]  PSYCHIATRIC FOLLOW-UP NOTE    Name: Jerman Gilmore  MRN: [id number]  : 1990  Age: 27 y.o.  Date of assessment: 2017  PCP: Kwesi Lange M.D.  Persons in attendance: Patient  REASON FOR VISIT/CHIEF COMPLAINT (as stated by Patient):  Jerman Gilmore is a 27 y.o., White male, attending follow-up appointment for   Chief Complaint   Patient presents with   • Medication Management     The patient is seen today for follow up on his depression, anxiety, and insomnia. The patient requested prescription today.   .      HISTORY OF PRESENT ILLNESS:    This is a 28 yo male, employed with [employer], lives with parents, seen today for follow up. The patient has been working in medical field in day shift and he is able to keep good night sleep with clonazepam 1 mg at night. The patient is taking porzac 40 mg per day, trazodone 100 mg one at night and hydroxyzine 50 at night. The patient is unchanged from previous visit.      PSYCHOSOCIAL CHANGES SINCE PREVIOUS CONTACT:  Depression, anxiety, and insomnia improved with medications.     RESPONSE TO TREATMENT:  Good.    MEDICATION SIDE EFFECTS:  Denied.    REVIEW OF SYSTEMS:        Constitutional negative   Eyes negative   Ears/Nose/Mouth/Throat negative   Cardiovascular negative   Respiratory negative   Gastrointestinal negative   Genitourinary negative   Muscular negative   Integumentary negative   Neurological negative   Endocrine negative   Hematologic/Lymphatic negative       PSYCHIATRIC EXAMINATION/MENTAL STATUS  /70 mmHg  Pulse 76  Temp(Src) 36.7 °C (98.1 °F)  Resp 14  Ht 1.702 m (5' 7.01\")  Wt 71.668 kg (158 lb)  BMI 24.74 kg/m2  Participation: Active verbal participation, Attentive and Engaged  Grooming:Neat  Orientation: Alert and Fully Oriented  Eye contact: Good  Behavior:Calm   Mood: Anxious  Affect: Anxious  Thought process: Logical and Goal-directed  Thought content:  Within normal limits  Speech: Rate within " normal limits and Volume within normal limits  Perception:  Within normal limits  Memory:  No gross evidence of memory deficits  Insight: Good  Judgment: Good  Family/couple interaction observations:   Other:    Current risk:    Suicide: Low   Homicide: Low   Self-harm: Low  Relapse: Low  Other:   Crisis Safety Plan reviewed?Yes  If evidence of imminent risk is present, intervention/plan:    Medical Records/Labs/Diagnostic Tests Reviewed: yes.    Medical Records/Labs/Diagnostic Tests Ordered: none.    DIAGNOSTIC IMPRESSION(S):  1. Mild episode of recurrent major depressive disorder (CMS-HCC)    2. JULIO (generalized anxiety disorder)           ASSESSMENT AND PLAN:  1. Major depression  2. JULIO  Fluoxetine 40 mg one a day.  Hydroxyzine 50 mg one at night.  Clonazepam 0.25 mg one a day # 30 refills one.    3. Primary Insomnia.  Trazodone 100 mg one at night  Clonazepam 1 mg one at night # 30 refills one.    3. Follow up in two months.    16. minutes were spent in psychotherapy.  (If greater than 16 minutes, add 17472 code)   Topics addressed in psychotherapy include:  We talked about improving his self esteem and self confidence.  The patient is working on his negativity and talk about more positive thought.  The patient has practicing relaxation and breathing exercise to relieave his anxiety.  Vineet Riojas M.D.

## 2017-09-21 ENCOUNTER — OFFICE VISIT (OUTPATIENT)
Dept: BEHAVIORAL HEALTH | Facility: PHYSICIAN GROUP | Age: 27
End: 2017-09-21
Payer: OTHER GOVERNMENT

## 2017-09-21 VITALS
TEMPERATURE: 97.7 F | HEIGHT: 67 IN | WEIGHT: 156 LBS | HEART RATE: 74 BPM | BODY MASS INDEX: 24.48 KG/M2 | RESPIRATION RATE: 14 BRPM | SYSTOLIC BLOOD PRESSURE: 124 MMHG | DIASTOLIC BLOOD PRESSURE: 70 MMHG

## 2017-09-21 DIAGNOSIS — F41.1 GAD (GENERALIZED ANXIETY DISORDER): ICD-10-CM

## 2017-09-21 DIAGNOSIS — F33.0 MILD EPISODE OF RECURRENT MAJOR DEPRESSIVE DISORDER (HCC): ICD-10-CM

## 2017-09-21 PROCEDURE — 90833 PSYTX W PT W E/M 30 MIN: CPT | Performed by: PSYCHIATRY & NEUROLOGY

## 2017-09-21 PROCEDURE — 99213 OFFICE O/P EST LOW 20 MIN: CPT | Performed by: PSYCHIATRY & NEUROLOGY

## 2017-09-21 ASSESSMENT — PATIENT HEALTH QUESTIONNAIRE - PHQ9
SUM OF ALL RESPONSES TO PHQ9 QUESTIONS 1 AND 2: 2
1. LITTLE INTEREST OR PLEASURE IN DOING THINGS: 1
6. FEELING BAD ABOUT YOURSELF - OR THAT YOU ARE A FAILURE OR HAVE LET YOURSELF OR YOUR FAMILY DOWN: 0
4. FEELING TIRED OR HAVING LITTLE ENERGY: 1
SUM OF ALL RESPONSES TO PHQ QUESTIONS 1-9: 4
9. THOUGHTS THAT YOU WOULD BE BETTER OFF DEAD, OR OF HURTING YOURSELF: 0
8. MOVING OR SPEAKING SO SLOWLY THAT OTHER PEOPLE COULD HAVE NOTICED. OR THE OPPOSITE, BEING SO FIGETY OR RESTLESS THAT YOU HAVE BEEN MOVING AROUND A LOT MORE THAN USUAL: 0
3. TROUBLE FALLING OR STAYING ASLEEP OR SLEEPING TOO MUCH: 1
7. TROUBLE CONCENTRATING ON THINGS, SUCH AS READING THE NEWSPAPER OR WATCHING TELEVISION: 0
2. FEELING DOWN, DEPRESSED, IRRITABLE, OR HOPELESS: 1
5. POOR APPETITE OR OVEREATING: 0

## 2017-09-21 NOTE — PROGRESS NOTES
"[facility]  PSYCHIATRIC FOLLOW-UP NOTE    Name: Jerman Gilmore  MRN: [id number]  : 1990  Age: 27 y.o.  Date of assessment: 2017  PCP: Kwesi Lange M.D.  Persons in attendance: Patient  REASON FOR VISIT/CHIEF COMPLAINT (as stated by Patient):  Jerman Gilmore is a 27 y.o., White male, attending follow-up appointment for   Chief Complaint   Patient presents with   • Medication Management     I am retired from [employer] after seven years and I had a job interview [facility] [employer].   .      HISTORY OF PRESENT ILLNESS:    This is a 28 yo male, seen today for follow up. The patient is looking for a job and he is done with [employer]. 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 The patient using his vacation time from [employer] and he going for a trip with his parents for a week. The patient has been taking his medications as prescribed and he denied any side effects. The patient has been sleeping well with trazodone and clonazepam and his depression improved with prozac.    PSYCHOSOCIAL CHANGES SINCE PREVIOUS CONTACT:  The patients depression and anxiety improved.    RESPONSE TO TREATMENT:  The patient is responding well to medications.    MEDICATION SIDE EFFECTS:  Denied.    REVIEW OF SYSTEMS:        Constitutional negative   Eyes negative   Ears/Nose/Mouth/Throat negative   Cardiovascular negative   Respiratory negative   Gastrointestinal negative   Genitourinary negative   Muscular negative   Integumentary negative   Neurological negative   Endocrine negative   Hematologic/Lymphatic negative       PSYCHIATRIC EXAMINATION/MENTAL STATUS  /70   Pulse 74   Temp 36.5 °C (97.7 °F)   Resp 14   Ht 1.702 m (5' 7.01\")   Wt 70.8 kg (156 lb)   BMI 24.43 kg/m²   Participation: Active verbal participation, Attentive and Engaged  Grooming:Casual  Orientation: Alert and Fully Oriented  Eye contact: Good  Behavior:Calm   Mood: Anxious  Affect: Anxious  Thought process: " Logical and Goal-directed  Thought content:  Within normal limits  Speech: Rate within normal limits and Volume within normal limits  Perception:  Within normal limits  Memory:  No gross evidence of memory deficits  Insight: Good  Judgment: Good  Family/couple interaction observations:   Other:    Current risk:    Suicide: Low   Homicide: Low   Self-harm: Low  Relapse: Low  Other:   Crisis Safety Plan reviewed?yes.  If evidence of imminent risk is present, intervention/plan:    Medical Records/Labs/Diagnostic Tests Reviewed: yes.    Medical Records/Labs/Diagnostic Tests Ordered: none.    DIAGNOSTIC IMPRESSION(S):  1. Mild episode of recurrent major depressive disorder (CMS-HCC)    2. JULIO (generalized anxiety disorder)           ASSESSMENT AND PLAN:    1. Major depression  2. JULIO  Fluoxetine 40 mg one a day.  Hydroxyzine 50 mg one at night  Clonazepam 0.25 mg one a day.    3. Primary insomnia  Trazodone 100 mg one at night  Clonazepam 1 mg one at night    4. Follow up in two months    16 minutes were spent in psychotherapy.  (If greater than 16 minutes, add 74937 code)   Topics addressed in psychotherapy include:  The patient is improving his self esteem.  The patient is working on his negativity and changing to positive.  The patient is keeping a structured life and use his coping skills to work through daily stress.  Using his support system to get through the stress.  Vineet Riojas M.D.

## 2017-10-25 ENCOUNTER — APPOINTMENT (OUTPATIENT)
Dept: OCCUPATIONAL MEDICINE | Facility: CLINIC | Age: 27
End: 2017-10-25

## 2017-10-25 ENCOUNTER — EH NON-PROVIDER (OUTPATIENT)
Dept: OCCUPATIONAL MEDICINE | Facility: CLINIC | Age: 27
End: 2017-10-25

## 2017-10-25 DIAGNOSIS — Z02.1 PRE-EMPLOYMENT DRUG SCREENING: ICD-10-CM

## 2017-10-25 LAB
AMP AMPHETAMINE: NORMAL
BAR BARBITURATES: NORMAL
BZO BENZODIAZEPINES: NORMAL
COC COCAINE: NORMAL
INT CON NEG: NORMAL
INT CON POS: NORMAL
MDMA ECSTASY: NORMAL
MET METHAMPHETAMINES: NORMAL
MTD METHADONE: NORMAL
OPI OPIATES: NORMAL
OXY OXYCODONE: NORMAL
PCP PHENCYCLIDINE: NORMAL
POC URINE DRUG SCREEN OCDRS: NORMAL
THC: NORMAL

## 2017-10-25 PROCEDURE — 80305 DRUG TEST PRSMV DIR OPT OBS: CPT | Performed by: PREVENTIVE MEDICINE

## 2017-10-26 RX ORDER — CLONAZEPAM 0.25 MG/1
TABLET, ORALLY DISINTEGRATING ORAL
Qty: 30 TAB | Refills: 1 | Status: SHIPPED
Start: 2017-10-26 | End: 2020-08-11

## 2017-10-26 RX ORDER — CLONAZEPAM 1 MG/1
TABLET ORAL
Qty: 30 TAB | Refills: 1 | Status: SHIPPED
Start: 2017-10-26 | End: 2020-08-11

## 2017-11-10 ENCOUNTER — EMPLOYEE HEALTH (OUTPATIENT)
Dept: OCCUPATIONAL MEDICINE | Facility: CLINIC | Age: 27
End: 2017-11-10

## 2017-11-10 ENCOUNTER — HOSPITAL ENCOUNTER (OUTPATIENT)
Facility: MEDICAL CENTER | Age: 27
End: 2017-11-10
Attending: PREVENTIVE MEDICINE
Payer: COMMERCIAL

## 2017-11-10 ENCOUNTER — EH NON-PROVIDER (OUTPATIENT)
Dept: OCCUPATIONAL MEDICINE | Facility: CLINIC | Age: 27
End: 2017-11-10

## 2017-11-10 VITALS
HEIGHT: 67 IN | WEIGHT: 153 LBS | BODY MASS INDEX: 24.01 KG/M2 | OXYGEN SATURATION: 100 % | SYSTOLIC BLOOD PRESSURE: 122 MMHG | DIASTOLIC BLOOD PRESSURE: 72 MMHG | TEMPERATURE: 98 F | HEART RATE: 82 BPM | RESPIRATION RATE: 14 BRPM

## 2017-11-10 DIAGNOSIS — Z02.1 ENCOUNTER FOR PRE-EMPLOYMENT HEALTH SCREENING EXAMINATION: ICD-10-CM

## 2017-11-10 PROCEDURE — 90715 TDAP VACCINE 7 YRS/> IM: CPT | Performed by: PREVENTIVE MEDICINE

## 2017-11-10 PROCEDURE — 86480 TB TEST CELL IMMUN MEASURE: CPT | Performed by: PREVENTIVE MEDICINE

## 2017-11-10 PROCEDURE — 8915 PR COMPREHENSIVE PHYSICAL: Performed by: PREVENTIVE MEDICINE

## 2017-11-10 PROCEDURE — 94375 RESPIRATORY FLOW VOLUME LOOP: CPT | Performed by: PREVENTIVE MEDICINE

## 2017-11-10 PROCEDURE — 86787 VARICELLA-ZOSTER ANTIBODY: CPT | Performed by: PREVENTIVE MEDICINE

## 2017-11-11 LAB — VZV IGG SER IA-ACNC: POSITIVE

## 2017-11-13 LAB
M TB TUBERC IFN-G BLD QL: NEGATIVE
M TB TUBERC IFN-G/MITOGEN IGNF BLD: 0
M TB TUBERC IGNF/MITOGEN IGNF CONTROL: 40.3 [IU]/ML
MITOGEN IGNF BCKGRD COR BLD-ACNC: 0.01 [IU]/ML

## 2017-11-30 ENCOUNTER — APPOINTMENT (OUTPATIENT)
Dept: BEHAVIORAL HEALTH | Facility: PHYSICIAN GROUP | Age: 27
End: 2017-11-30
Payer: OTHER GOVERNMENT

## 2017-12-19 RX ORDER — FLUOXETINE HYDROCHLORIDE 40 MG/1
CAPSULE ORAL
Qty: 30 CAP | Refills: 2 | Status: SHIPPED | OUTPATIENT
Start: 2017-12-19

## 2018-01-22 ENCOUNTER — APPOINTMENT (OUTPATIENT)
Dept: BEHAVIORAL HEALTH | Facility: PHYSICIAN GROUP | Age: 28
End: 2018-01-22
Payer: OTHER GOVERNMENT

## 2018-09-13 ENCOUNTER — HOSPITAL ENCOUNTER (OUTPATIENT)
Dept: LAB | Facility: MEDICAL CENTER | Age: 28
End: 2018-09-13
Payer: COMMERCIAL

## 2018-09-13 LAB
BDY FAT % MEASURED: 10.6 %
BP DIAS: 53 MMHG
BP SYS: 107 MMHG
CHOLEST SERPL-MCNC: 203 MG/DL (ref 100–199)
DIABETES HTDIA: NO
EVENT NAME HTEVT: NORMAL
FASTING HTFAS: YES
GLUCOSE SERPL-MCNC: 91 MG/DL (ref 65–99)
HDLC SERPL-MCNC: 70 MG/DL
HYPERTENSION HTHYP: NO
LDLC SERPL CALC-MCNC: 116 MG/DL
SCREENING LOC CITY HTCIT: NORMAL
SCREENING LOC STATE HTSTA: NORMAL
SCREENING LOCATION HTLOC: NORMAL
SMOKING HTSMO: NO
SUBSCRIBER ID HTSID: NORMAL
TRIGL SERPL-MCNC: 84 MG/DL (ref 0–149)

## 2018-09-13 PROCEDURE — S5190 WELLNESS ASSESSMENT BY NONPH: HCPCS

## 2018-09-13 PROCEDURE — 36415 COLL VENOUS BLD VENIPUNCTURE: CPT

## 2018-09-13 PROCEDURE — 82947 ASSAY GLUCOSE BLOOD QUANT: CPT

## 2018-09-13 PROCEDURE — 80061 LIPID PANEL: CPT

## 2018-09-24 ENCOUNTER — IMMUNIZATION (OUTPATIENT)
Dept: OCCUPATIONAL MEDICINE | Facility: CLINIC | Age: 28
End: 2018-09-24

## 2018-09-24 DIAGNOSIS — Z23 NEED FOR VACCINATION: ICD-10-CM

## 2018-09-24 PROCEDURE — 90686 IIV4 VACC NO PRSV 0.5 ML IM: CPT | Performed by: PREVENTIVE MEDICINE

## 2018-12-06 ENCOUNTER — NON-PROVIDER VISIT (OUTPATIENT)
Dept: OCCUPATIONAL MEDICINE | Facility: CLINIC | Age: 28
End: 2018-12-06

## 2019-05-22 ENCOUNTER — NON-PROVIDER VISIT (OUTPATIENT)
Dept: OCCUPATIONAL MEDICINE | Facility: CLINIC | Age: 29
End: 2019-05-22
Payer: COMMERCIAL

## 2019-05-22 ENCOUNTER — HOSPITAL ENCOUNTER (EMERGENCY)
Facility: MEDICAL CENTER | Age: 29
End: 2019-05-22
Attending: EMERGENCY MEDICINE
Payer: COMMERCIAL

## 2019-05-22 VITALS
DIASTOLIC BLOOD PRESSURE: 47 MMHG | WEIGHT: 164 LBS | HEIGHT: 67 IN | RESPIRATION RATE: 18 BRPM | BODY MASS INDEX: 25.74 KG/M2 | HEART RATE: 93 BPM | SYSTOLIC BLOOD PRESSURE: 147 MMHG | TEMPERATURE: 98.6 F

## 2019-05-22 DIAGNOSIS — Z02.1 PRE-EMPLOYMENT DRUG SCREENING: ICD-10-CM

## 2019-05-22 DIAGNOSIS — Z02.83 ENCOUNTER FOR DRUG SCREENING: Primary | ICD-10-CM

## 2019-05-22 DIAGNOSIS — Z77.21 EXPOSURE TO BLOOD OR BODY FLUID: ICD-10-CM

## 2019-05-22 LAB
ALBUMIN SERPL BCP-MCNC: 5 G/DL (ref 3.2–4.9)
ALBUMIN/GLOB SERPL: 2.3 G/DL
ALP SERPL-CCNC: 65 U/L (ref 30–99)
ALT SERPL-CCNC: 15 U/L (ref 2–50)
AMP AMPHETAMINE: NORMAL
ANION GAP SERPL CALC-SCNC: 10 MMOL/L (ref 0–11.9)
AST SERPL-CCNC: 18 U/L (ref 12–45)
BAR BARBITURATES: NORMAL
BILIRUB SERPL-MCNC: 0.4 MG/DL (ref 0.1–1.5)
BREATH ALCOHOL COMMENT: NORMAL
BUN SERPL-MCNC: 17 MG/DL (ref 8–22)
BZO BENZODIAZEPINES: NORMAL
CALCIUM SERPL-MCNC: 9.4 MG/DL (ref 8.5–10.5)
CHLORIDE SERPL-SCNC: 103 MMOL/L (ref 96–112)
CO2 SERPL-SCNC: 25 MMOL/L (ref 20–33)
COC COCAINE: NORMAL
CREAT SERPL-MCNC: 1.2 MG/DL (ref 0.5–1.4)
ERYTHROCYTE [DISTWIDTH] IN BLOOD BY AUTOMATED COUNT: 38.5 FL (ref 35.9–50)
GLOBULIN SER CALC-MCNC: 2.2 G/DL (ref 1.9–3.5)
GLUCOSE SERPL-MCNC: 93 MG/DL (ref 65–99)
HBV CORE AB SERPL QL IA: NEGATIVE
HBV SURFACE AB SERPL IA-ACNC: >1000 MIU/ML (ref 0–10)
HBV SURFACE AG SER QL: NEGATIVE
HCT VFR BLD AUTO: 42.2 % (ref 42–52)
HCV AB SER QL: NEGATIVE
HGB BLD-MCNC: 14.9 G/DL (ref 14–18)
HIV 1+2 AB+HIV1 P24 AG SERPL QL IA: NON REACTIVE
INT CON NEG: NORMAL
INT CON POS: NORMAL
MCH RBC QN AUTO: 30.8 PG (ref 27–33)
MCHC RBC AUTO-ENTMCNC: 35.3 G/DL (ref 33.7–35.3)
MCV RBC AUTO: 87.4 FL (ref 81.4–97.8)
MDMA ECSTASY: NORMAL
MET METHAMPHETAMINES: NORMAL
MTD METHADONE: NORMAL
OPI OPIATES: NORMAL
OXY OXYCODONE: NORMAL
PCP PHENCYCLIDINE: NORMAL
PLATELET # BLD AUTO: 251 K/UL (ref 164–446)
PMV BLD AUTO: 8.6 FL (ref 9–12.9)
POC BREATHALIZER: 0 PERCENT (ref 0–0.01)
POC URINE DRUG SCREEN OCDRS: NEGATIVE
POTASSIUM SERPL-SCNC: 3.7 MMOL/L (ref 3.6–5.5)
PROT SERPL-MCNC: 7.2 G/DL (ref 6–8.2)
RBC # BLD AUTO: 4.83 M/UL (ref 4.7–6.1)
SODIUM SERPL-SCNC: 138 MMOL/L (ref 135–145)
THC: NORMAL
WBC # BLD AUTO: 5.9 K/UL (ref 4.8–10.8)

## 2019-05-22 PROCEDURE — 85027 COMPLETE CBC AUTOMATED: CPT

## 2019-05-22 PROCEDURE — 86803 HEPATITIS C AB TEST: CPT

## 2019-05-22 PROCEDURE — 87389 HIV-1 AG W/HIV-1&-2 AB AG IA: CPT

## 2019-05-22 PROCEDURE — 82075 ASSAY OF BREATH ETHANOL: CPT | Performed by: PREVENTIVE MEDICINE

## 2019-05-22 PROCEDURE — 36415 COLL VENOUS BLD VENIPUNCTURE: CPT

## 2019-05-22 PROCEDURE — 87340 HEPATITIS B SURFACE AG IA: CPT

## 2019-05-22 PROCEDURE — 99283 EMERGENCY DEPT VISIT LOW MDM: CPT

## 2019-05-22 PROCEDURE — 86706 HEP B SURFACE ANTIBODY: CPT

## 2019-05-22 PROCEDURE — 80053 COMPREHEN METABOLIC PANEL: CPT

## 2019-05-22 PROCEDURE — 86704 HEP B CORE ANTIBODY TOTAL: CPT

## 2019-05-22 PROCEDURE — 99026 IN-HOSPITAL ON CALL SERVICE: CPT | Performed by: PREVENTIVE MEDICINE

## 2019-05-22 PROCEDURE — 80305 DRUG TEST PRSMV DIR OPT OBS: CPT | Performed by: PREVENTIVE MEDICINE

## 2019-05-22 ASSESSMENT — LIFESTYLE VARIABLES: DO YOU DRINK ALCOHOL: NO
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
## 2019-09-24 ENCOUNTER — IMMUNIZATION (OUTPATIENT)
Dept: OCCUPATIONAL MEDICINE | Facility: CLINIC | Age: 29
End: 2019-09-24

## 2019-09-24 ENCOUNTER — HOSPITAL ENCOUNTER (OUTPATIENT)
Dept: LAB | Facility: MEDICAL CENTER | Age: 29
End: 2019-09-24
Payer: COMMERCIAL

## 2019-09-24 DIAGNOSIS — Z23 NEED FOR VACCINATION: ICD-10-CM

## 2019-09-24 LAB
BDY FAT % MEASURED: 17.7 %
BP DIAS: 72 MMHG
BP SYS: 120 MMHG
CHOLEST SERPL-MCNC: 226 MG/DL (ref 100–199)
DIABETES HTDIA: NO
EVENT NAME HTEVT: NORMAL
FASTING HTFAS: YES
GLUCOSE SERPL-MCNC: 97 MG/DL (ref 65–99)
HDLC SERPL-MCNC: 78 MG/DL
HYPERTENSION HTHYP: NO
LDLC SERPL CALC-MCNC: 126 MG/DL
SCREENING LOC CITY HTCIT: NORMAL
SCREENING LOC STATE HTSTA: NORMAL
SCREENING LOCATION HTLOC: NORMAL
SMOKING HTSMO: NO
SUBSCRIBER ID HTSID: NORMAL
TRIGL SERPL-MCNC: 109 MG/DL (ref 0–149)

## 2019-09-24 PROCEDURE — 82947 ASSAY GLUCOSE BLOOD QUANT: CPT

## 2019-09-24 PROCEDURE — 80061 LIPID PANEL: CPT

## 2019-09-24 PROCEDURE — 36415 COLL VENOUS BLD VENIPUNCTURE: CPT

## 2019-09-24 PROCEDURE — S5190 WELLNESS ASSESSMENT BY NONPH: HCPCS

## 2019-09-24 PROCEDURE — 90686 IIV4 VACC NO PRSV 0.5 ML IM: CPT | Performed by: PREVENTIVE MEDICINE

## 2019-12-24 ENCOUNTER — OFFICE VISIT (OUTPATIENT)
Dept: URGENT CARE | Facility: CLINIC | Age: 29
End: 2019-12-24
Payer: COMMERCIAL

## 2019-12-24 VITALS
DIASTOLIC BLOOD PRESSURE: 78 MMHG | BODY MASS INDEX: 29.19 KG/M2 | HEIGHT: 67 IN | SYSTOLIC BLOOD PRESSURE: 118 MMHG | TEMPERATURE: 97.3 F | RESPIRATION RATE: 16 BRPM | OXYGEN SATURATION: 95 % | HEART RATE: 80 BPM | WEIGHT: 186 LBS

## 2019-12-24 DIAGNOSIS — L73.9 FOLLICULITIS: ICD-10-CM

## 2019-12-24 PROCEDURE — 99203 OFFICE O/P NEW LOW 30 MIN: CPT | Performed by: PHYSICIAN ASSISTANT

## 2019-12-24 RX ORDER — DOXYCYCLINE HYCLATE 100 MG
100 TABLET ORAL 2 TIMES DAILY
Qty: 20 TAB | Refills: 0 | Status: SHIPPED | OUTPATIENT
Start: 2019-12-24 | End: 2020-01-03

## 2019-12-30 ASSESSMENT — ENCOUNTER SYMPTOMS: FEVER: 0

## 2019-12-31 NOTE — PROGRESS NOTES
Subjective:      Jerman Gilmore is a 29 y.o. male who presents with Rash (face different spots, red and itchi at times)    PMH:  has a past medical history of Anxiety, Depression, and Insomnia.  MEDS:   Current Outpatient Medications:   •  doxycycline (VIBRAMYCIN) 100 MG Tab, Take 1 Tab by mouth 2 times a day for 10 days., Disp: 20 Tab, Rfl: 0  •  trazodone (DESYREL) 100 MG Tab, TAKE 1 TABLET BY MOUTH EVERY NIGHT AT BEDTIME, Disp: 30 Tab, Rfl: 0  •  fluoxetine (PROZAC) 40 MG capsule, TAKE 1 CAPSULE BY MOUTH EVERY DAY, Disp: 30 Cap, Rfl: 2  •  hydrOXYzine HCl (ATARAX) 50 MG Tab, TAKE 1 TABLET BY MOUTH EVERY NIGHT AT BEDTIME, Disp: 30 Tab, Rfl: 0  •  clonazepam (KLONOPIN) 1 MG Tab, TAKE 1 TABLET BY MOUTH EVERY NIGHT AT BEDTIME, Disp: 30 Tab, Rfl: 1  •  Clonazepam 0.25 MG TABLET DISPERSIBLE, TAKE 1 TABLET BY MOUTH EVERY DAY AS NEEDED, Disp: 30 Tab, Rfl: 1  •  clonazepam (KLONOPIN) 1 MG Tab, Take 1 Tab by mouth every bedtime., Disp: 30 Tab, Rfl: 1  •  Clonazepam 0.25 MG TABLET DISPERSIBLE, Take 1 Tab by mouth every day., Disp: 30 Tab, Rfl: 1  •  fluoxetine (PROZAC) 40 MG capsule, TAKE ONE CAPSULE BY MOUTH EVERY DAY, Disp: 30 Cap, Rfl: 1  •  clonazepam (KLONOPIN) 1 MG Tab, Take 0.5 mg by mouth 2 times a day., Disp: , Rfl:   •  clonazepam (KLONOPIN) 1 MG Tab, Take 1 mg by mouth 1 time daily as needed (at night)., Disp: , Rfl:   •  fluoxetine (PROZAC) 20 MG Cap, Take 40 mg by mouth every day., Disp: , Rfl:   •  Cholecalciferol (VITAMIN D3) 2000 UNIT CAPS, Take 1 Cap by mouth every day., Disp: 90 Cap, Rfl: 3  •  mirtazapine (REMERON) 15 MG TABS, Take 1 Tab by mouth every bedtime., Disp: 30 Tab, Rfl: 0  ALLERGIES: No Known Allergies  SURGHX:   Past Surgical History:   Procedure Laterality Date   • DENTAL EXTRACTION(S)       SOCHX:  reports that he quit smoking about 4 years ago. His smoking use included cigarettes. He has a 2.00 pack-year smoking history. He has never used smokeless tobacco. He reports that he does  "not drink alcohol or use drugs.  FH: Reviewed with patient, not pertinent to this visit.           Patient presents with:  Rash: face different spots, red and itchy at times, firm and painful at times.          Rash   This is a new problem. The current episode started 1 to 4 weeks ago. The problem has been waxing and waning since onset. The affected locations include the face and neck. The rash is characterized by redness, pain, draining and itchiness. He was exposed to nothing. Pertinent negatives include no fever. Past treatments include antihistamine, topical steroids and moisturizer. The treatment provided no relief. There is no history of eczema.       Review of Systems   Constitutional: Negative for fever.   Skin: Positive for itching and rash.   All other systems reviewed and are negative.         Objective:     /78   Pulse 80   Temp 36.3 °C (97.3 °F)   Resp 16   Ht 1.702 m (5' 7\")   Wt 84.4 kg (186 lb)   SpO2 95%   BMI 29.13 kg/m²      Physical Exam  Vitals signs and nursing note reviewed.   Constitutional:       General: He is not in acute distress.     Appearance: Normal appearance. He is well-developed.   HENT:      Head: Normocephalic and atraumatic.        Nose: Nose normal.      Mouth/Throat:      Mouth: Mucous membranes are moist.   Eyes:      Extraocular Movements: Extraocular movements intact.      Conjunctiva/sclera: Conjunctivae normal.      Pupils: Pupils are equal, round, and reactive to light.   Neck:      Musculoskeletal: Normal range of motion.   Cardiovascular:      Rate and Rhythm: Normal rate and regular rhythm.      Heart sounds: Normal heart sounds.   Pulmonary:      Effort: Pulmonary effort is normal.      Breath sounds: Normal breath sounds.   Abdominal:      Palpations: Abdomen is soft.   Musculoskeletal: Normal range of motion.   Skin:     General: Skin is warm and dry.      Capillary Refill: Capillary refill takes less than 2 seconds.      Findings: Rash present. Rash " is macular and papular.   Neurological:      Mental Status: He is alert and oriented to person, place, and time.      Coordination: Coordination normal.      Gait: Gait normal.   Psychiatric:         Mood and Affect: Mood normal.                 Assessment/Plan:     1. Folliculitis  doxycycline (VIBRAMYCIN) 100 MG Tab     PT encouraged to wash face twice daily, no moisturizers until antibiotics are completed.     Continue antihistamines for itching as desired.     PT should follow up with PCP in 1-2 days for re-evaluation if symptoms have not improved.  Discussed red flags and reasons to return to UC or ED.  Pt and/or family verbalized understanding of diagnosis and follow up instructions and was offered informational handout on diagnosis.  PT discharged.

## 2020-01-16 ENCOUNTER — NON-PROVIDER VISIT (OUTPATIENT)
Dept: OCCUPATIONAL MEDICINE | Facility: CLINIC | Age: 30
End: 2020-01-16
Payer: COMMERCIAL

## 2020-01-16 ENCOUNTER — HOSPITAL ENCOUNTER (EMERGENCY)
Facility: MEDICAL CENTER | Age: 30
End: 2020-01-16
Attending: EMERGENCY MEDICINE
Payer: COMMERCIAL

## 2020-01-16 ENCOUNTER — APPOINTMENT (OUTPATIENT)
Dept: RADIOLOGY | Facility: MEDICAL CENTER | Age: 30
End: 2020-01-16
Attending: EMERGENCY MEDICINE
Payer: COMMERCIAL

## 2020-01-16 VITALS
WEIGHT: 180 LBS | HEART RATE: 71 BPM | TEMPERATURE: 98.6 F | BODY MASS INDEX: 28.25 KG/M2 | OXYGEN SATURATION: 93 % | SYSTOLIC BLOOD PRESSURE: 124 MMHG | RESPIRATION RATE: 18 BRPM | DIASTOLIC BLOOD PRESSURE: 76 MMHG | HEIGHT: 67 IN

## 2020-01-16 DIAGNOSIS — M25.522 LEFT ELBOW PAIN: ICD-10-CM

## 2020-01-16 DIAGNOSIS — Z02.83 ENCOUNTER FOR DRUG SCREENING: ICD-10-CM

## 2020-01-16 LAB
AMP AMPHETAMINE: NORMAL
BAR BARBITURATES: NORMAL
BREATH ALCOHOL COMMENT: NORMAL
BZO BENZODIAZEPINES: NORMAL
COC COCAINE: NORMAL
INT CON NEG: NORMAL
INT CON POS: NORMAL
MDMA ECSTASY: NORMAL
MET METHAMPHETAMINES: NORMAL
MTD METHADONE: NORMAL
OPI OPIATES: NORMAL
OXY OXYCODONE: NORMAL
PCP PHENCYCLIDINE: NORMAL
POC BREATHALIZER: 0 PERCENT (ref 0–0.01)
POC URINE DRUG SCREEN OCDRS: NORMAL
THC: NORMAL

## 2020-01-16 PROCEDURE — 82075 ASSAY OF BREATH ETHANOL: CPT | Performed by: PREVENTIVE MEDICINE

## 2020-01-16 PROCEDURE — 99283 EMERGENCY DEPT VISIT LOW MDM: CPT

## 2020-01-16 PROCEDURE — 80305 DRUG TEST PRSMV DIR OPT OBS: CPT | Performed by: PREVENTIVE MEDICINE

## 2020-01-16 PROCEDURE — 99026 IN-HOSPITAL ON CALL SERVICE: CPT | Performed by: PREVENTIVE MEDICINE

## 2020-01-16 PROCEDURE — 73080 X-RAY EXAM OF ELBOW: CPT | Mod: LT
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
## 2020-01-16 NOTE — ED NOTES
Pt has abrasion to left elbow, full ROM, denies pain at this time. CMS intact. Denies head injury.

## 2020-01-16 NOTE — ED NOTES
Discharge instructions given and discussed.Pt educated to come back to ER for new or worsening symptoms and follow up with PCP as instructed. Pt verbalized understanding. VSS. Pt  Discharged in stable condition.

## 2020-08-11 ENCOUNTER — NON-PROVIDER VISIT (OUTPATIENT)
Dept: OCCUPATIONAL MEDICINE | Facility: CLINIC | Age: 30
End: 2020-08-11
Payer: COMMERCIAL

## 2020-08-11 ENCOUNTER — HOSPITAL ENCOUNTER (EMERGENCY)
Facility: MEDICAL CENTER | Age: 30
End: 2020-08-11
Attending: EMERGENCY MEDICINE
Payer: COMMERCIAL

## 2020-08-11 VITALS
DIASTOLIC BLOOD PRESSURE: 62 MMHG | RESPIRATION RATE: 15 BRPM | WEIGHT: 180 LBS | HEART RATE: 84 BPM | SYSTOLIC BLOOD PRESSURE: 131 MMHG | TEMPERATURE: 98.4 F | BODY MASS INDEX: 28.19 KG/M2 | OXYGEN SATURATION: 96 %

## 2020-08-11 DIAGNOSIS — Z77.21 EXPOSURE TO BLOOD OR BODY FLUID: ICD-10-CM

## 2020-08-11 DIAGNOSIS — Z02.83 ENCOUNTER FOR DRUG SCREENING: ICD-10-CM

## 2020-08-11 PROCEDURE — 36415 COLL VENOUS BLD VENIPUNCTURE: CPT

## 2020-08-11 PROCEDURE — 85027 COMPLETE CBC AUTOMATED: CPT

## 2020-08-11 PROCEDURE — 82075 ASSAY OF BREATH ETHANOL: CPT | Performed by: NURSE PRACTITIONER

## 2020-08-11 PROCEDURE — 87389 HIV-1 AG W/HIV-1&-2 AB AG IA: CPT

## 2020-08-11 PROCEDURE — 80305 DRUG TEST PRSMV DIR OPT OBS: CPT | Performed by: NURSE PRACTITIONER

## 2020-08-11 PROCEDURE — 80053 COMPREHEN METABOLIC PANEL: CPT

## 2020-08-11 PROCEDURE — 99283 EMERGENCY DEPT VISIT LOW MDM: CPT

## 2020-08-11 PROCEDURE — 87340 HEPATITIS B SURFACE AG IA: CPT

## 2020-08-11 PROCEDURE — 99026 IN-HOSPITAL ON CALL SERVICE: CPT | Performed by: NURSE PRACTITIONER

## 2020-08-11 ASSESSMENT — FIBROSIS 4 INDEX: FIB4 SCORE: 0.56
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
## 2020-08-11 NOTE — ED NOTES
Patient walked with a steady gate at this time to er [facility] area room  80. Placed patient into gown, auto bp, spo2, and gave  call light. Ready to be seen    rn at bedside
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
## 2020-08-12 LAB
ALBUMIN SERPL BCP-MCNC: 5.1 G/DL (ref 3.2–4.9)
ALBUMIN/GLOB SERPL: 2.3 G/DL
ALP SERPL-CCNC: 65 U/L (ref 30–99)
ALT SERPL-CCNC: 28 U/L (ref 2–50)
ANION GAP SERPL CALC-SCNC: 16 MMOL/L (ref 7–16)
AST SERPL-CCNC: 27 U/L (ref 12–45)
BILIRUB SERPL-MCNC: 0.3 MG/DL (ref 0.1–1.5)
BUN SERPL-MCNC: 16 MG/DL (ref 8–22)
CALCIUM SERPL-MCNC: 9.5 MG/DL (ref 8.5–10.5)
CHLORIDE SERPL-SCNC: 102 MMOL/L (ref 96–112)
CO2 SERPL-SCNC: 22 MMOL/L (ref 20–33)
CREAT SERPL-MCNC: 1.29 MG/DL (ref 0.5–1.4)
GLOBULIN SER CALC-MCNC: 2.2 G/DL (ref 1.9–3.5)
GLUCOSE SERPL-MCNC: 87 MG/DL (ref 65–99)
HBV SURFACE AG SER QL: ABNORMAL
HIV 1+2 AB+HIV1 P24 AG SERPL QL IA: ABNORMAL
POTASSIUM SERPL-SCNC: 4.1 MMOL/L (ref 3.6–5.5)
PROT SERPL-MCNC: 7.3 G/DL (ref 6–8.2)
SODIUM SERPL-SCNC: 140 MMOL/L (ref 135–145)
WBC # BLD AUTO: ABNORMAL K/UL (ref 4.8–10.8)

## 2020-08-12 NOTE — ED NOTES
Pt discharged home as ordered by erp. Pt instructed to follow up with occupational health. Pt verbalized understanding and left ambulating independently

## 2020-09-22 ENCOUNTER — IMMUNIZATION (OUTPATIENT)
Dept: OCCUPATIONAL MEDICINE | Facility: CLINIC | Age: 30
End: 2020-09-22
Payer: COMMERCIAL

## 2020-09-22 DIAGNOSIS — Z23 NEED FOR VACCINATION: ICD-10-CM

## 2020-09-22 PROCEDURE — 90686 IIV4 VACC NO PRSV 0.5 ML IM: CPT | Performed by: PREVENTIVE MEDICINE

## 2020-12-12 ENCOUNTER — HOSPITAL ENCOUNTER (OUTPATIENT)
Facility: MEDICAL CENTER | Age: 30
End: 2020-12-12
Attending: NURSE PRACTITIONER
Payer: COMMERCIAL

## 2020-12-12 ENCOUNTER — OFFICE VISIT (OUTPATIENT)
Dept: URGENT CARE | Facility: CLINIC | Age: 30
End: 2020-12-12
Payer: COMMERCIAL

## 2020-12-12 VITALS
SYSTOLIC BLOOD PRESSURE: 128 MMHG | WEIGHT: 187 LBS | OXYGEN SATURATION: 94 % | TEMPERATURE: 98.2 F | DIASTOLIC BLOOD PRESSURE: 80 MMHG | RESPIRATION RATE: 14 BRPM | HEIGHT: 67 IN | HEART RATE: 84 BPM | BODY MASS INDEX: 29.35 KG/M2

## 2020-12-12 DIAGNOSIS — B34.9 VIRAL ILLNESS: ICD-10-CM

## 2020-12-12 PROCEDURE — U0003 INFECTIOUS AGENT DETECTION BY NUCLEIC ACID (DNA OR RNA); SEVERE ACUTE RESPIRATORY SYNDROME CORONAVIRUS 2 (SARS-COV-2) (CORONAVIRUS DISEASE [COVID-19]), AMPLIFIED PROBE TECHNIQUE, MAKING USE OF HIGH THROUGHPUT TECHNOLOGIES AS DESCRIBED BY CMS-2020-01-R: HCPCS

## 2020-12-12 PROCEDURE — 99213 OFFICE O/P EST LOW 20 MIN: CPT | Performed by: NURSE PRACTITIONER

## 2020-12-12 ASSESSMENT — FIBROSIS 4 INDEX: FIB4 SCORE: 0.61

## 2020-12-12 NOTE — PROGRESS NOTES
"  Chief Complaint   Patient presents with   • Runny Nose         HISTORY OF PRESENT ILLNESS: Patient is a 30 y.o. male with who presents today due to URI symptoms which started approx 2 weeks ago.  He admits to rhinitis, mild dry cough, sore throat, headache.  Denies any fever, chills, malaise, chest pain, shortness of breath.  He does report \" an upset stomach\" for the past 3 months, is currently working with his primary care provider regarding such, last seen this week by his PCP.  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       Patient Active Problem List    Diagnosis Date Noted   • Hypovitaminosis D 2015       Allergies:Patient has no known allergies.    Current Outpatient Medications Ordered in Epic   Medication Sig Dispense Refill   • fluoxetine (PROZAC) 40 MG capsule TAKE 1 CAPSULE BY MOUTH EVERY DAY 30 Cap 2   • hydrOXYzine HCl (ATARAX) 50 MG Tab TAKE 1 TABLET BY MOUTH EVERY NIGHT AT BEDTIME 30 Tab 0   • trazodone (DESYREL) 100 MG Tab TAKE 1 TABLET BY MOUTH EVERY NIGHT AT BEDTIME 30 Tab 0     No current Epic-ordered facility-administered medications on file.        Past Medical History:   Diagnosis Date   • Anxiety    • Depression    • Insomnia        Social History     Tobacco Use   • Smoking status: Former Smoker     Packs/day: 1.00     Years: 2.00     Pack years: 2.00     Types: Cigarettes     Quit date: 3/20/2015     Years since quittin.7   • Smokeless tobacco: Never Used   Substance Use Topics   • Alcohol use: Yes   • Drug use: Yes     Types: Inhaled     Comment: [facility]       Family Status   Relation Name Status   • Sis  (Not Specified)   • MGFa  (Not Specified)   • Mo  (Not Specified)   • Fa  (Not Specified)   • PGFa  (Not Specified)   • Neg Hx  (Not Specified)     Family History   Problem Relation Age of Onset   • Psychiatric Illness Sister         insomnia   • Anxiety disorder Sister    • Psychiatric Illness Maternal Grandfather         " "insomnia   • Depression Maternal Grandfather    • Anxiety disorder Maternal Grandfather    • Stroke Mother    • Thyroid Mother    • Depression Father    • Suicide Attempts Paternal Grandfather    • Cancer Neg Hx    • Diabetes Neg Hx    • Heart Disease Neg Hx    • Hypertension Neg Hx    • Hyperlipidemia Neg Hx          ROS:  Review of Systems   Constitutional: Negative for fever, chills, fatigue, malaise. Negative for weight loss.  HENT: Positive for rhinitis, sore throat. Negative for ear pain, nosebleeds, and neck pain.    Eyes: Negative for vision changes.   Cardiovascular: Negative for chest pain, palpitations, orthopnea and leg swelling.   Respiratory: Positive for cough without sputum production. Negative for shortness of breath and wheezing.   Gastrointestinal: Positive for chronic GI complaints. Negative for abdominal pain, vomiting or diarrhea.   Skin: Negative for rash, diaphoresis.     Exam:  /80 (BP Location: Left arm, Patient Position: Sitting, BP Cuff Size: Adult)   Pulse 84   Temp 36.8 °C (98.2 °F) (Temporal)   Resp 14   Ht 1.702 m (5' 7\")   Wt 84.8 kg (187 lb)   SpO2 94%   General: well-nourished, well-developed male in NAD  Head: normocephalic, atraumatic  Eyes: PERRLA, EOM within normal limits, no conjunctival injection, no scleral icterus, visual fields and acuity grossly intact.  Ears: normal shape and symmetry, no tenderness, no discharge. External canals are without any significant edema or erythema. Tympanic membranes are without any inflammation, no effusion. Gross auditory acuity is intact.  Nose: symmetrical without tenderness, mild discharge, erythema present bilateral nares.  No sinus tenderness.  Mouth/Throat: reasonable hygiene, no exudates or tonsillar enlargement.  No erythema present.   Neck: no masses, range of motion within normal limits, no tracheal deviation.  Lymph: mild cervical adenopathy. No supraclavicular adenopathy.   Neuro: alert and oriented. Cranial nerves " 1-12 grossly intact.   Cardiovascular: regular rate and rhythm without murmurs, rubs, or gallops. No edema.   Pulmonary: no distress. Chest is symmetrical with respiration. No wheezes, crackles, or rhonchi.   Musculoskeletal: appropriate muscle tone, gait is stable.  GI: soft, non-tender, no guarding, no hepatosplenomegaly.   Skin: warm, dry, intact, no clubbing, no cyanosis.   Psych: appropriate mood, affect, judgement.         Assessment/Plan:  1. Viral illness  COVID/SARS CoV-2 PCR           Discussed symptoms most likely viral, will test for COVID. Home quarantine advised. Discussed natural progression and sx care.  Rest, increase fluids, hand and respiratory hygiene. May take OTC medications as directed for symptom relief. STRICT ER precautions.   Supportive care, differential diagnoses, and indications for immediate follow-up discussed with patient.   Pathogenesis of diagnosis discussed including typical length and natural progression.  Instructed to return to clinic or nearest emergency department for any change in condition, further concerns, or worsening of symptoms.  Patient states understanding of the plan of care and discharge instructions.          KRIS Rodriguez.

## 2020-12-13 LAB
COVID ORDER STATUS COVID19: NORMAL
SARS-COV-2 RNA RESP QL NAA+PROBE: NOTDETECTED
SPECIMEN SOURCE: NORMAL

## 2020-12-16 DIAGNOSIS — Z23 NEED FOR VACCINATION: ICD-10-CM

## 2020-12-31 ENCOUNTER — IMMUNIZATION (OUTPATIENT)
Dept: FAMILY PLANNING/WOMEN'S HEALTH CLINIC | Facility: IMMUNIZATION CENTER | Age: 30
End: 2020-12-31
Attending: FAMILY MEDICINE
Payer: COMMERCIAL

## 2020-12-31 DIAGNOSIS — Z23 ENCOUNTER FOR VACCINATION: Primary | ICD-10-CM

## 2020-12-31 DIAGNOSIS — Z23 NEED FOR VACCINATION: ICD-10-CM

## 2020-12-31 PROCEDURE — 0011A MODERNA SARS-COV-2 VACCINE: CPT

## 2020-12-31 PROCEDURE — 91301 MODERNA SARS-COV-2 VACCINE: CPT

## 2021-01-30 PROCEDURE — 91301 MODERNA SARS-COV-2 VACCINE: CPT

## 2021-01-30 PROCEDURE — 0012A MODERNA SARS-COV-2 VACCINE: CPT

## 2021-02-01 ENCOUNTER — IMMUNIZATION (OUTPATIENT)
Dept: FAMILY PLANNING/WOMEN'S HEALTH CLINIC | Facility: IMMUNIZATION CENTER | Age: 31
End: 2021-02-01
Payer: COMMERCIAL

## 2021-02-01 DIAGNOSIS — Z23 ENCOUNTER FOR VACCINATION: Primary | ICD-10-CM

## 2021-03-30 ENCOUNTER — OFFICE VISIT (OUTPATIENT)
Dept: URGENT CARE | Facility: CLINIC | Age: 31
End: 2021-03-30
Payer: COMMERCIAL

## 2021-03-30 VITALS
OXYGEN SATURATION: 95 % | DIASTOLIC BLOOD PRESSURE: 90 MMHG | HEART RATE: 84 BPM | RESPIRATION RATE: 16 BRPM | WEIGHT: 189 LBS | TEMPERATURE: 97.9 F | SYSTOLIC BLOOD PRESSURE: 122 MMHG | HEIGHT: 67 IN | BODY MASS INDEX: 29.66 KG/M2

## 2021-03-30 DIAGNOSIS — H00.022 HORDEOLUM INTERNUM OF RIGHT LOWER EYELID: ICD-10-CM

## 2021-03-30 PROCEDURE — 99213 OFFICE O/P EST LOW 20 MIN: CPT | Performed by: STUDENT IN AN ORGANIZED HEALTH CARE EDUCATION/TRAINING PROGRAM

## 2021-03-30 ASSESSMENT — FIBROSIS 4 INDEX: FIB4 SCORE: 0.61

## 2021-03-30 NOTE — PROGRESS NOTES
Subjective:   CHIEF COMPLAINT  Chief Complaint   Patient presents with   • Eye Problem     cyst on right eyelid, few days, painful, red and itchi       [facility]  Jerman Gilmore is a 30 y.o. male who presents chief complaint of eye irritation, a cyst and itchy sensation in his right eye for 3 days.  Patient wears glasses.  Says he has tried eyedrops which is not helped.  He does not recall getting any foreign bodies in the eye within the last 3 days.  There has been no discharge or redness.    REVIEW OF SYSTEMS  General: no fever or chills  GI: no nausea or vomiting  See HPI for further details.    PAST MEDICAL HISTORY  Patient Active Problem List    Diagnosis Date Noted   • Hypovitaminosis D 2015       SURGICAL HISTORY   has a past surgical history that includes dental extraction(s).    ALLERGIES  No Known Allergies    CURRENT MEDICATIONS  Home Medications     Reviewed by Buck Lala D.O. (Physician) on 21 at 1314  Med List Status: <None>   Medication Last Dose Status   fluoxetine (PROZAC) 40 MG capsule Taking Active   hydrOXYzine HCl (ATARAX) 50 MG Tab Taking Active   trazodone (DESYREL) 100 MG Tab Taking Active                SOCIAL HISTORY  Social History     Tobacco Use   • Smoking status: Former Smoker     Packs/day: 1.00     Years: 2.00     Pack years: 2.00     Types: Cigarettes     Quit date: 3/20/2015     Years since quittin.0   • Smokeless tobacco: Never Used   Substance and Sexual Activity   • Alcohol use: Yes   • Drug use: Yes     Types: Inhaled     Comment: brandi   • Sexual activity: Never     Partners: Female       FAMILY HISTORY  Family History   Problem Relation Age of Onset   • Psychiatric Illness Sister         insomnia   • Anxiety disorder Sister    • Psychiatric Illness Maternal Grandfather         insomnia   • Depression Maternal Grandfather    • Anxiety disorder Maternal Grandfather    • Stroke Mother    • Thyroid Mother    • Depression Father    • Suicide Attempts  "Paternal Grandfather    • Cancer Neg Hx    • Diabetes Neg Hx    • Heart Disease Neg Hx    • Hypertension Neg Hx    • Hyperlipidemia Neg Hx           Objective:   PHYSICAL EXAM  VITAL SIGNS: /90 (BP Location: Left arm, Patient Position: Sitting, BP Cuff Size: Adult)   Pulse 84   Temp 36.6 °C (97.9 °F) (Temporal)   Resp 16   Ht 1.702 m (5' 7\")   Wt 85.7 kg (189 lb)   SpO2 95%   BMI 29.60 kg/m²     Gen: no acute distress, normal voice  Skin: dry, intact, moist mucosal membranes  Eye: hordelolum right lower eyelid  Lungs: CTAB w/ symmetric expansion  CV: RRR w/o murmurs or clicks  Psych: normal affect, normal judgement, alert, awake    Assessment/Plan:     1. Hordeolum internum of right lower eyelid     Self-limiting and discussed symptomatic treatment  -Warm compresses  -Ibuprofen 800 mg 3 times daily as needed  -Follow-up as needed    Differential diagnosis, natural history, supportive care, and indications for immediate follow-up discussed. All questions answered. Patient agrees with the plan of care.    Follow-up as needed if symptoms worsen or fail to improve to PCP, Urgent care or Emergency Room.    Please note that this dictation was created using voice recognition software. I have made a reasonable attempt to correct obvious errors, but I expect that there are errors of grammar and possibly content that I did not discover before finalizing the note.         "

## 2022-02-10 ENCOUNTER — PHARMACY VISIT (OUTPATIENT)
Dept: PHARMACY | Facility: MEDICAL CENTER | Age: 32
End: 2022-02-10
Payer: COMMERCIAL

## 2022-02-10 PROCEDURE — RXMED WILLOW AMBULATORY MEDICATION CHARGE: Performed by: INTERNAL MEDICINE

## 2022-02-10 RX ORDER — CX-024414 0.2 MG/ML
0.25 INJECTION, SUSPENSION INTRAMUSCULAR
Qty: 0.25 ML | Refills: 0 | Status: SHIPPED | OUTPATIENT
Start: 2022-02-10 | End: 2022-04-12

## 2022-04-11 ENCOUNTER — APPOINTMENT (OUTPATIENT)
Dept: URGENT CARE | Facility: CLINIC | Age: 32
End: 2022-04-11
Payer: COMMERCIAL

## 2022-04-12 ENCOUNTER — OFFICE VISIT (OUTPATIENT)
Dept: URGENT CARE | Facility: CLINIC | Age: 32
End: 2022-04-12
Payer: COMMERCIAL

## 2022-04-12 VITALS
BODY MASS INDEX: 31.55 KG/M2 | DIASTOLIC BLOOD PRESSURE: 78 MMHG | WEIGHT: 201 LBS | OXYGEN SATURATION: 95 % | TEMPERATURE: 97.1 F | SYSTOLIC BLOOD PRESSURE: 134 MMHG | RESPIRATION RATE: 16 BRPM | HEIGHT: 67 IN | HEART RATE: 78 BPM

## 2022-04-12 DIAGNOSIS — J02.9 SORE THROAT: ICD-10-CM

## 2022-04-12 LAB
INT CON NEG: NORMAL
INT CON POS: NORMAL
S PYO AG THROAT QL: NEGATIVE

## 2022-04-12 PROCEDURE — 87880 STREP A ASSAY W/OPTIC: CPT | Performed by: FAMILY MEDICINE

## 2022-04-12 PROCEDURE — 99213 OFFICE O/P EST LOW 20 MIN: CPT | Performed by: FAMILY MEDICINE

## 2022-04-12 RX ORDER — FLUTICASONE PROPIONATE 50 MCG
1 SPRAY, SUSPENSION (ML) NASAL 2 TIMES DAILY
Qty: 16 G | Refills: 0 | Status: SHIPPED | OUTPATIENT
Start: 2022-04-12 | End: 2022-04-19

## 2022-04-12 RX ORDER — FLUOXETINE HYDROCHLORIDE 20 MG/1
CAPSULE ORAL
COMMUNITY
Start: 2022-03-08 | End: 2022-04-12

## 2022-04-12 NOTE — PROGRESS NOTES
CC:  cough        Cough  This is a new problem. The current episode started 2 days ago. The problem has been unchanged. The problem occurs constantly. The cough is dry.   + subj.  fever. Pertinent negatives include no   headaches, nausea, vomiting, diarrhea, sweats, weight loss or wheezing. Nothing aggravates the symptoms.  Patient has tried nothing for the symptoms. There is no history of asthma.        Past Medical History:   Diagnosis Date   • Anxiety    • Depression    • Insomnia          Social History     Tobacco Use   • Smoking status: Former Smoker     Packs/day: 1.00     Years: 2.00     Pack years: 2.00     Types: Cigarettes     Quit date: 3/20/2015     Years since quittin.0   • Smokeless tobacco: Never Used   Substance Use Topics   • Alcohol use: Yes   • Drug use: Yes     Types: Inhaled     Comment: brandi         Current Outpatient Medications on File Prior to Visit   Medication Sig Dispense Refill   • fluoxetine (PROZAC) 40 MG capsule TAKE 1 CAPSULE BY MOUTH EVERY DAY 30 Cap 2   • trazodone (DESYREL) 100 MG Tab TAKE 1 TABLET BY MOUTH EVERY NIGHT AT BEDTIME 30 Tab 0   • FLUoxetine (PROZAC) 20 MG Cap  (Patient not taking: Reported on 2022)     • COVID-19 mRNA vaccine, Moderna, (MODERNA COVID-19 VACCINE) 100 MCG/0.5ML Suspension injection Inject 0.25 mL into the shoulder, thigh, or buttocks. (Patient not taking: Reported on 2022) 0.25 mL 0   • hydrOXYzine HCl (ATARAX) 50 MG Tab TAKE 1 TABLET BY MOUTH EVERY NIGHT AT BEDTIME (Patient not taking: Reported on 2022) 30 Tab 0     No current facility-administered medications on file prior to visit.                    Review of Systems   Constitutional: + for fever    HENT: negative for otalgia  Cardiovascular - denies chest pain or dyspnea  Respiratory: Positive for cough.  .  Negative for wheezing.    Neurological: Negative for headaches.   GI - denies nausea, vomiting or diarrhea  Neuro - denies numbness or tingling.            Objective:  "    /78 (BP Location: Left arm, Patient Position: Sitting, BP Cuff Size: Adult)   Pulse 78   Temp 36.2 °C (97.1 °F) (Temporal)   Resp 16   Ht 1.702 m (5' 7\")   Wt 91.2 kg (201 lb)   SpO2 95%     Physical Exam   Constitutional: patient is oriented to person, place, and time. Patient appears well-developed and well-nourished. No distress.   HENT:   Head: Normocephalic and atraumatic.   Right Ear: External ear normal.   Left Ear: External ear normal.   Nose: Mucosal edema  present. Right sinus exhibits no maxillary sinus tenderness. Left sinus exhibits no maxillary sinus tenderness.   Mouth/Throat: Mucous membranes are normal. No oral lesions.  No posterior pharyngeal erythema.  No oropharyngeal exudate or posterior oropharyngeal edema.   Eyes: Conjunctivae and EOM are normal. Pupils are equal, round, and reactive to light. Right eye exhibits no discharge. Left eye exhibits no discharge. No scleral icterus.   Neck: Normal range of motion. Neck supple. No tracheal deviation present.   Cardiovascular: Normal rate, regular rhythm and normal heart sounds.  Exam reveals no friction rub.    Pulmonary/Chest: Effort normal. No respiratory distress. Patient has no wheezes or rhonchi. Patient has no rales.    Musculoskeletal:  exhibits no edema.   Lymphadenopathy:     Patient has no cervical adenopathy.      Neurological: patient is alert and oriented to person, place, and time.   Skin: Skin is warm and dry. No rash noted. No erythema.   Psychiatric: patient  has a normal mood and affect.  behavior is normal.   Nursing note and vitals reviewed.              Assessment/Plan:       1. Sore throat  Pt presents with sore throat and systemic symptoms (fever)    Rapid strep negative.        - fluticasone (FLONASE) 50 MCG/ACT nasal spray; Administer 1 Spray into affected nostril(S) 2 times a day for 7 days.  Dispense: 16 g; Refill: 0         Follow up in one week if no improvement    "

## 2022-04-12 NOTE — LETTER
April 12, 2022         Patient: Jerman Gilmore   YOB: 1990   Date of Visit: 4/12/2022           To Whom it May Concern:    Jerman Gilmore was seen in my clinic on 4/12/2022. He may return to work on Wednesday/Thrusday.    If you have any questions or concerns, please don't hesitate to call.      Sincerely,           Peña Euceda M.D.  Electronically Signed

## 2023-04-25 ENCOUNTER — OFFICE VISIT (OUTPATIENT)
Dept: URGENT CARE | Facility: PHYSICIAN GROUP | Age: 33
End: 2023-04-25
Payer: COMMERCIAL

## 2023-04-25 VITALS
DIASTOLIC BLOOD PRESSURE: 78 MMHG | TEMPERATURE: 97.4 F | HEIGHT: 67 IN | BODY MASS INDEX: 30.76 KG/M2 | SYSTOLIC BLOOD PRESSURE: 112 MMHG | WEIGHT: 196 LBS | HEART RATE: 75 BPM | OXYGEN SATURATION: 95 % | RESPIRATION RATE: 18 BRPM

## 2023-04-25 DIAGNOSIS — J30.2 SEASONAL ALLERGIC RHINITIS, UNSPECIFIED TRIGGER: ICD-10-CM

## 2023-04-25 PROCEDURE — 99213 OFFICE O/P EST LOW 20 MIN: CPT | Performed by: PHYSICIAN ASSISTANT

## 2023-04-25 RX ORDER — HYDROXYZINE HYDROCHLORIDE 25 MG/1
25 TABLET, FILM COATED ORAL 3 TIMES DAILY PRN
COMMUNITY
End: 2023-09-11

## 2023-04-25 RX ORDER — FLUTICASONE PROPIONATE 50 MCG
2 SPRAY, SUSPENSION (ML) NASAL DAILY
Qty: 16 G | Refills: 0 | Status: SHIPPED | OUTPATIENT
Start: 2023-04-25

## 2023-04-25 RX ORDER — FEXOFENADINE HCL 180 MG/1
180 TABLET ORAL DAILY
Qty: 30 TABLET | Refills: 0 | Status: SHIPPED | OUTPATIENT
Start: 2023-04-25

## 2023-04-25 ASSESSMENT — ENCOUNTER SYMPTOMS
FEVER: 0
SORE THROAT: 1
COUGH: 0
DIZZINESS: 0
SINUS PAIN: 0
MYALGIAS: 0
CHILLS: 0
HEADACHES: 0

## 2023-04-25 NOTE — LETTER
April 25, 2023    To Whom It May Concern:         This is confirmation that Jerman Gilmore attended his scheduled appointment with Pierce Mclain P.A.-C. on 4/25/23.  Please excuse the patient's absence from work on 4/24/2023 and 4/25/2023.         If you have any questions please do not hesitate to call me at the phone number listed below.    Sincerely,          Pierce Mclain P.A.-C.  [phone]

## 2023-04-25 NOTE — PROGRESS NOTES
Subjective:     CHIEF COMPLAINT  Chief Complaint   Patient presents with    Pharyngitis     Stuffy nose,not sleeping,x3 weeks       HPI  Jerman Gilmore is a very pleasant 32 y.o. male who presents to the clinic with sinus congestion and sore throat x3 weeks.  Sore throat is predominantly present at night and in the morning when the patient is lying down.  This gradually improves throughout the day.  This has been interfering with his sleep.  He is also experienced nasal congestion with clear rhinorrhea.  States he otherwise feels well.  No fevers, chills or myalgias.  No known ill contacts.  No history of seasonal allergies.  No OTC medications have been started at this time.    REVIEW OF SYSTEMS  Review of Systems   Constitutional:  Negative for chills, fever and malaise/fatigue.   HENT:  Positive for congestion and sore throat. Negative for ear pain and sinus pain.    Respiratory:  Negative for cough.    Musculoskeletal:  Negative for myalgias.   Skin:  Negative for rash.   Neurological:  Negative for dizziness and headaches.   Endo/Heme/Allergies:  Negative for environmental allergies.     PAST MEDICAL HISTORY  Patient Active Problem List    Diagnosis Date Noted    Hypovitaminosis D 2015       SURGICAL HISTORY   has a past surgical history that includes dental extraction(s).    ALLERGIES  No Known Allergies    CURRENT MEDICATIONS  Home Medications       Reviewed by Pierce Mclain P.A.-C. (Physician Assistant) on 23 at 1204  Med List Status: <None>     Medication Last Dose Status   fluoxetine (PROZAC) 40 MG capsule Not Taking Active   hydrOXYzine HCl (ATARAX) 25 MG Tab  Active   HYDROXYZINE HCL PO Taking Active   trazodone (DESYREL) 100 MG Tab Taking Active                    SOCIAL HISTORY  Social History     Tobacco Use    Smoking status: Former     Packs/day: 1.00     Years: 2.00     Pack years: 2.00     Types: Cigarettes     Quit date: 3/20/2015     Years since quittin.1    Smokeless tobacco:  "Never   Substance and Sexual Activity    Alcohol use: Yes    Drug use: Yes     Types: Inhaled     Comment: brandi    Sexual activity: Never     Partners: Female       FAMILY HISTORY  Family History   Problem Relation Age of Onset    Psychiatric Illness Sister         insomnia    Anxiety disorder Sister     Psychiatric Illness Maternal Grandfather         insomnia    Depression Maternal Grandfather     Anxiety disorder Maternal Grandfather     Stroke Mother     Thyroid Mother     Depression Father     Suicide Attempts Paternal Grandfather     Cancer Neg Hx     Diabetes Neg Hx     Heart Disease Neg Hx     Hypertension Neg Hx     Hyperlipidemia Neg Hx           Objective:     VITAL SIGNS: /78 (BP Location: Right arm, Patient Position: Sitting, BP Cuff Size: Adult)   Pulse 75   Temp 36.3 °C (97.4 °F) (Temporal)   Resp 18   Ht 1.702 m (5' 7\")   Wt 88.9 kg (196 lb)   SpO2 95%   BMI 30.70 kg/m²     PHYSICAL EXAM  Physical Exam  Constitutional:       General: He is not in acute distress.     Appearance: Normal appearance. He is not ill-appearing, toxic-appearing or diaphoretic.   HENT:      Head: Normocephalic and atraumatic.      Right Ear: Ear canal and external ear normal.      Left Ear: Ear canal and external ear normal.      Ears:      Comments: Serous middle ear effusion bilaterally     Nose: Congestion and rhinorrhea (clear) present.      Mouth/Throat:      Mouth: Mucous membranes are moist.      Pharynx: No oropharyngeal exudate or posterior oropharyngeal erythema.      Comments: Cobblestone appearance to the posterior oropharynx.  No erythema, edema or exudate.  Eyes:      Conjunctiva/sclera: Conjunctivae normal.   Cardiovascular:      Rate and Rhythm: Normal rate and regular rhythm.      Pulses: Normal pulses.      Heart sounds: Normal heart sounds.   Pulmonary:      Effort: Pulmonary effort is normal.      Breath sounds: Normal breath sounds. No wheezing.   Musculoskeletal:      Cervical back: " Normal range of motion. No muscular tenderness.   Lymphadenopathy:      Cervical: No cervical adenopathy.   Skin:     General: Skin is warm and dry.      Capillary Refill: Capillary refill takes less than 2 seconds.   Neurological:      Mental Status: He is alert.   Psychiatric:         Mood and Affect: Mood normal.         Thought Content: Thought content normal.       Assessment/Plan:     1. Seasonal allergic rhinitis, unspecified trigger  fluticasone (FLONASE) 50 MCG/ACT nasal spray    fexofenadine (ALLEGRA) 180 MG tablet        MDM/Comments:    Findings consistent with seasonal allergic rhinitis.  Advised starting Flonase 2 sprays per nostril 1 time daily.  OTC antihistamine as discussed.    Differential diagnosis, natural history, supportive care, and indications for immediate follow-up discussed. All questions answered. Patient agrees with the plan of care.    Follow-up as needed if symptoms worsen or fail to improve to PCP, Urgent care or Emergency Room.    I have personally reviewed prior external notes and test results pertinent to today's visit.  I have independently reviewed and interpreted all diagnostics ordered during this urgent care acute visit.   Discussed management options (risks,benefits, and alternatives to treatment). Pt expresses understanding and the treatment plan was agreed upon. Questions were encouraged and answered to pt's satisfaction.    Please note that this dictation was created using voice recognition software. I have made a reasonable attempt to correct obvious errors, but I expect that there are errors of grammar and possibly content that I did not discover before finalizing the note.

## 2023-07-10 ENCOUNTER — HOSPITAL ENCOUNTER (EMERGENCY)
Facility: MEDICAL CENTER | Age: 33
End: 2023-07-10
Attending: EMERGENCY MEDICINE
Payer: COMMERCIAL

## 2023-07-10 ENCOUNTER — EH NON-PROVIDER (OUTPATIENT)
Dept: OCCUPATIONAL MEDICINE | Facility: CLINIC | Age: 33
End: 2023-07-10
Payer: COMMERCIAL

## 2023-07-10 VITALS
RESPIRATION RATE: 16 BRPM | BODY MASS INDEX: 30.76 KG/M2 | TEMPERATURE: 96.8 F | HEART RATE: 78 BPM | OXYGEN SATURATION: 96 % | DIASTOLIC BLOOD PRESSURE: 78 MMHG | WEIGHT: 196 LBS | SYSTOLIC BLOOD PRESSURE: 128 MMHG | HEIGHT: 67 IN

## 2023-07-10 DIAGNOSIS — Z02.83 ENCOUNTER FOR DRUG SCREENING: ICD-10-CM

## 2023-07-10 DIAGNOSIS — S05.02XA ABRASION OF LEFT CORNEA, INITIAL ENCOUNTER: ICD-10-CM

## 2023-07-10 PROCEDURE — 80305 DRUG TEST PRSMV DIR OPT OBS: CPT | Performed by: NURSE PRACTITIONER

## 2023-07-10 PROCEDURE — 82075 ASSAY OF BREATH ETHANOL: CPT | Performed by: NURSE PRACTITIONER

## 2023-07-10 PROCEDURE — 99283 EMERGENCY DEPT VISIT LOW MDM: CPT

## 2023-07-10 PROCEDURE — 700101 HCHG RX REV CODE 250: Performed by: EMERGENCY MEDICINE

## 2023-07-10 PROCEDURE — 99026 IN-HOSPITAL ON CALL SERVICE: CPT | Performed by: NURSE PRACTITIONER

## 2023-07-10 RX ORDER — ERYTHROMYCIN 5 MG/G
1 OINTMENT OPHTHALMIC ONCE
Status: COMPLETED | OUTPATIENT
Start: 2023-07-10 | End: 2023-07-10

## 2023-07-10 RX ORDER — PROPARACAINE HYDROCHLORIDE 5 MG/ML
1 SOLUTION/ DROPS OPHTHALMIC ONCE
Status: COMPLETED | OUTPATIENT
Start: 2023-07-10 | End: 2023-07-10

## 2023-07-10 RX ORDER — ERYTHROMYCIN 5 MG/G
1 OINTMENT OPHTHALMIC 4 TIMES DAILY
Qty: 3.5 G | Refills: 0 | Status: ACTIVE | OUTPATIENT
Start: 2023-07-10 | End: 2023-09-11

## 2023-07-10 RX ADMIN — ERYTHROMYCIN 1 APPLICATION: 5 OINTMENT OPHTHALMIC at 16:15

## 2023-07-10 RX ADMIN — PROPARACAINE HYDROCHLORIDE 1 DROP: 5 SOLUTION/ DROPS OPHTHALMIC at 15:45

## 2023-07-10 RX ADMIN — FLUORESCEIN SODIUM 1 MG: 1 STRIP OPHTHALMIC at 15:45

## 2023-07-10 ASSESSMENT — PAIN DESCRIPTION - PAIN TYPE: TYPE: ACUTE PAIN
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
## 2023-07-10 NOTE — ED NOTES
Pt given discharge paperwork. Pt verbalized understanding of steps moving forward. Pt ambulatory out of ED.
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
## 2023-07-13 ENCOUNTER — OCCUPATIONAL MEDICINE (OUTPATIENT)
Dept: OCCUPATIONAL MEDICINE | Facility: CLINIC | Age: 33
End: 2023-07-13
Payer: COMMERCIAL

## 2023-07-13 VITALS
OXYGEN SATURATION: 99 % | HEART RATE: 84 BPM | WEIGHT: 195 LBS | DIASTOLIC BLOOD PRESSURE: 88 MMHG | TEMPERATURE: 98 F | HEIGHT: 67 IN | BODY MASS INDEX: 30.61 KG/M2 | SYSTOLIC BLOOD PRESSURE: 124 MMHG | RESPIRATION RATE: 14 BRPM

## 2023-07-13 DIAGNOSIS — S05.02XD ABRASION OF LEFT CORNEA, SUBSEQUENT ENCOUNTER: ICD-10-CM

## 2023-07-13 PROCEDURE — 3079F DIAST BP 80-89 MM HG: CPT | Performed by: PREVENTIVE MEDICINE

## 2023-07-13 PROCEDURE — 99202 OFFICE O/P NEW SF 15 MIN: CPT | Performed by: PREVENTIVE MEDICINE

## 2023-07-13 PROCEDURE — 3074F SYST BP LT 130 MM HG: CPT | Performed by: PREVENTIVE MEDICINE
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
## 2023-09-11 ENCOUNTER — OFFICE VISIT (OUTPATIENT)
Dept: URGENT CARE | Facility: CLINIC | Age: 33
End: 2023-09-11
Payer: COMMERCIAL

## 2023-09-11 VITALS
TEMPERATURE: 97.8 F | BODY MASS INDEX: 30.45 KG/M2 | HEIGHT: 67 IN | WEIGHT: 194 LBS | OXYGEN SATURATION: 93 % | SYSTOLIC BLOOD PRESSURE: 126 MMHG | RESPIRATION RATE: 18 BRPM | DIASTOLIC BLOOD PRESSURE: 84 MMHG | HEART RATE: 76 BPM

## 2023-09-11 DIAGNOSIS — S46.211A STRAIN OF RIGHT BICEPS, INITIAL ENCOUNTER: ICD-10-CM

## 2023-09-11 DIAGNOSIS — S56.911A STRAIN OF RIGHT FOREARM, INITIAL ENCOUNTER: ICD-10-CM

## 2023-09-11 PROCEDURE — 99213 OFFICE O/P EST LOW 20 MIN: CPT | Performed by: PHYSICIAN ASSISTANT

## 2023-09-11 PROCEDURE — 3079F DIAST BP 80-89 MM HG: CPT | Performed by: PHYSICIAN ASSISTANT

## 2023-09-11 PROCEDURE — 3074F SYST BP LT 130 MM HG: CPT | Performed by: PHYSICIAN ASSISTANT

## 2023-09-11 NOTE — PROGRESS NOTES
"Subjective:   Jerman Gilmore is a 33 y.o. male who presents for Arm Pain (Right arm px and bruising after straining it )      HPI  The patient presents to the Urgent Care with complaints of right arm pain onset 2 days ago.  Patient states he was help moving and carrying a piece of heavy furniture when he tripped and as he was lowering the piece of furniture down he may have hyperextended his right arm and felt a sudden pain.  Pain has gradually improved but noticed worsening bruising to the area.  Pain is primarily to his distal bicep and his medial forearm.  No numbness or tingling.  He has full range of motion of the elbow.  Full range of motion of the wrist.  No other injury.        Past Medical History:   Diagnosis Date    Anxiety     Depression     Insomnia      No Known Allergies     Objective:     /84   Pulse 76   Temp 36.6 °C (97.8 °F) (Temporal)   Resp 18   Ht 1.702 m (5' 7\")   Wt 88 kg (194 lb)   SpO2 93%   BMI 30.38 kg/m²     Physical Exam  Vitals reviewed.   Constitutional:       General: He is not in acute distress.     Appearance: Normal appearance. He is not ill-appearing or toxic-appearing.   Eyes:      Conjunctiva/sclera: Conjunctivae normal.   Cardiovascular:      Rate and Rhythm: Normal rate.   Pulmonary:      Effort: Pulmonary effort is normal.   Musculoskeletal:        Arms:       Cervical back: Neck supple.      Comments: Right upper extremity: small localized bruising and mild tenderness to distal medial bicep. No pop eyes deformity. Small bruising and mild TTP to medial volar forearm. No significant swelling. Full ROM at the elbow and wrist. Strength of RUE 5/5 with breakaway weakness. No bony tenderness or deformity. Normal wrist exam. Neurovascularly intact.    Skin:     General: Skin is warm and dry.   Neurological:      General: No focal deficit present.      Mental Status: He is alert and oriented to person, place, and time.   Psychiatric:         Mood and Affect: Mood " normal.         Behavior: Behavior normal.         Diagnosis and associated orders:     1. Strain of right biceps, initial encounter    2. Strain of right forearm, initial encounter       Comments/MDM:     Patient's presenting symptoms and exam findings are consistent with right bicep strain and right forearm strain.   RICE therapy. Ibuprofen. Work note provided.   Return if no improvement in 1-2 weeks or if any worsening.        I personally reviewed prior external notes and test results pertinent to today's visit. Pathogenesis of diagnosis discussed including typical length and natural progression. Supportive care, natural history, differential diagnoses, and indications for immediate follow-up discussed. Patient expresses understanding and agrees to plan. Patient denies any other questions or concerns.     Follow-up with the primary care physician for recheck, reevaluation, and consideration of further management.    Please note that this dictation was created using voice recognition software. I have made a reasonable attempt to correct obvious errors, but I expect that there are errors of grammar and possibly content that I did not discover before finalizing the note.    This note was electronically signed by Chet Clark PA-C

## 2023-10-26 ENCOUNTER — PHARMACY VISIT (OUTPATIENT)
Dept: PHARMACY | Facility: MEDICAL CENTER | Age: 33
End: 2023-10-26
Payer: COMMERCIAL

## 2023-10-26 PROCEDURE — RXMED WILLOW AMBULATORY MEDICATION CHARGE: Performed by: INTERNAL MEDICINE

## 2023-10-26 RX ORDER — INFLUENZA A VIRUS A/BRISBANE/02/2018 IVR-190 (H1N1) ANTIGEN (FORMALDEHYDE INACTIVATED), INFLUENZA A VIRUS A/KANSAS/14/2017 X-327 (H3N2) ANTIGEN (FORMALDEHYDE INACTIVATED), INFLUENZA B VIRUS B/PHUKET/3073/2013 ANTIGEN (FORMALDEHYDE INACTIVATED), AND INFLUENZA B VIRUS B/MARYLAND/15/2016 BX-69A ANTIGEN (FORMALDEHYDE INACTIVATED) 15; 15; 15; 15 UG/.5ML; UG/.5ML; UG/.5ML; UG/.5ML
INJECTION, SUSPENSION INTRAMUSCULAR
Qty: 0.5 ML | Refills: 0 | OUTPATIENT
Start: 2023-10-26

## 2024-09-30 ENCOUNTER — IMMUNIZATION (OUTPATIENT)
Dept: OCCUPATIONAL MEDICINE | Facility: CLINIC | Age: 34
End: 2024-09-30

## 2024-09-30 DIAGNOSIS — Z23 NEED FOR VACCINATION: Primary | ICD-10-CM

## 2024-10-04 PROCEDURE — 90656 IIV3 VACC NO PRSV 0.5 ML IM: CPT | Performed by: NURSE PRACTITIONER

## 2024-11-26 ENCOUNTER — HOSPITAL ENCOUNTER (EMERGENCY)
Facility: MEDICAL CENTER | Age: 34
End: 2024-11-26
Attending: EMERGENCY MEDICINE
Payer: COMMERCIAL

## 2024-11-26 ENCOUNTER — EH NON-PROVIDER (OUTPATIENT)
Dept: OCCUPATIONAL MEDICINE | Facility: CLINIC | Age: 34
End: 2024-11-26
Payer: COMMERCIAL

## 2024-11-26 VITALS
DIASTOLIC BLOOD PRESSURE: 88 MMHG | BODY MASS INDEX: 30.53 KG/M2 | HEART RATE: 80 BPM | OXYGEN SATURATION: 94 % | TEMPERATURE: 97.5 F | HEIGHT: 66 IN | WEIGHT: 190 LBS | SYSTOLIC BLOOD PRESSURE: 133 MMHG | RESPIRATION RATE: 14 BRPM

## 2024-11-26 DIAGNOSIS — Z02.1 PRE-EMPLOYMENT DRUG SCREENING: ICD-10-CM

## 2024-11-26 DIAGNOSIS — Z77.21 EXPOSURE TO BLOOD OR BODY FLUID: ICD-10-CM

## 2024-11-26 DIAGNOSIS — Z02.83 ENCOUNTER FOR DRUG SCREENING: ICD-10-CM

## 2024-11-26 LAB
HBV CORE AB SERPL QL IA: NONREACTIVE
HBV SURFACE AB SERPL IA-ACNC: ABNORMAL MIU/ML (ref 0–10)
HBV SURFACE AG SER QL: ABNORMAL
HCV AB SER QL: ABNORMAL
HIV 1+2 AB+HIV1 P24 AG SERPL QL IA: ABNORMAL

## 2024-11-26 PROCEDURE — 99283 EMERGENCY DEPT VISIT LOW MDM: CPT

## 2024-11-26 PROCEDURE — 87340 HEPATITIS B SURFACE AG IA: CPT

## 2024-11-26 PROCEDURE — 82075 ASSAY OF BREATH ETHANOL: CPT | Performed by: NURSE PRACTITIONER

## 2024-11-26 PROCEDURE — 87389 HIV-1 AG W/HIV-1&-2 AB AG IA: CPT

## 2024-11-26 PROCEDURE — 86704 HEP B CORE ANTIBODY TOTAL: CPT

## 2024-11-26 PROCEDURE — 99026 IN-HOSPITAL ON CALL SERVICE: CPT | Performed by: NURSE PRACTITIONER

## 2024-11-26 PROCEDURE — 80305 DRUG TEST PRSMV DIR OPT OBS: CPT | Performed by: NURSE PRACTITIONER

## 2024-11-26 PROCEDURE — 86803 HEPATITIS C AB TEST: CPT

## 2024-11-26 PROCEDURE — 36415 COLL VENOUS BLD VENIPUNCTURE: CPT

## 2024-11-26 PROCEDURE — 86706 HEP B SURFACE ANTIBODY: CPT

## 2024-11-26 ASSESSMENT — PAIN DESCRIPTION - PAIN TYPE: TYPE: ACUTE PAIN
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
## 2024-11-27 NOTE — ED NOTES
PT given AVS documentation. PT verbally acknowledges understanding of discharge instructions. VSS. All belongings accounted for by PT.
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
## 2024-12-04 LAB
AMP AMPHETAMINE: NORMAL
BAR BARBITURATES: NORMAL
BREATH ALCOHOL COMMENT: 0
BZO BENZODIAZEPINES: NORMAL
COC COCAINE: NORMAL
INT CON NEG: NORMAL
INT CON POS: NORMAL
MDMA ECSTASY: NORMAL
MET METHAMPHETAMINES: NORMAL
MTD METHADONE: NORMAL
OPI OPIATES: NORMAL
OXY OXYCODONE: NORMAL
PCP PHENCYCLIDINE: NORMAL
POC BREATHALIZER: 0 PERCENT (ref 0–0.01)
POC URINE DRUG SCREEN OCDRS: NEGATIVE
THC: NORMAL

## 2025-02-04 ENCOUNTER — EH NON-PROVIDER (OUTPATIENT)
Dept: OCCUPATIONAL MEDICINE | Facility: CLINIC | Age: 35
End: 2025-02-04
Payer: COMMERCIAL

## 2025-02-04 ENCOUNTER — HOSPITAL ENCOUNTER (EMERGENCY)
Facility: MEDICAL CENTER | Age: 35
End: 2025-02-04
Attending: STUDENT IN AN ORGANIZED HEALTH CARE EDUCATION/TRAINING PROGRAM
Payer: COMMERCIAL

## 2025-02-04 VITALS
OXYGEN SATURATION: 95 % | BODY MASS INDEX: 28.25 KG/M2 | SYSTOLIC BLOOD PRESSURE: 151 MMHG | DIASTOLIC BLOOD PRESSURE: 83 MMHG | TEMPERATURE: 97.8 F | HEART RATE: 72 BPM | RESPIRATION RATE: 16 BRPM | WEIGHT: 180 LBS | HEIGHT: 67 IN

## 2025-02-04 DIAGNOSIS — Z02.1 PRE-EMPLOYMENT DRUG SCREENING: ICD-10-CM

## 2025-02-04 DIAGNOSIS — Z02.83 ENCOUNTER FOR DRUG SCREENING: ICD-10-CM

## 2025-02-04 DIAGNOSIS — Z57.8 EMPLOYEE EXPOSURE TO BODY FLUIDS: ICD-10-CM

## 2025-02-04 PROCEDURE — 99282 EMERGENCY DEPT VISIT SF MDM: CPT

## 2025-02-04 PROCEDURE — 80305 DRUG TEST PRSMV DIR OPT OBS: CPT | Performed by: NURSE PRACTITIONER

## 2025-02-04 PROCEDURE — 99026 IN-HOSPITAL ON CALL SERVICE: CPT | Performed by: NURSE PRACTITIONER

## 2025-02-04 PROCEDURE — 82075 ASSAY OF BREATH ETHANOL: CPT | Performed by: NURSE PRACTITIONER

## 2025-02-04 SDOH — HEALTH STABILITY - PHYSICAL HEALTH: OCCUPATIONAL EXPOSURE TO OTHER RISK FACTORS: Z57.8
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
## 2025-02-05 LAB
AMP AMPHETAMINE: NORMAL
BAR BARBITURATES: NORMAL
BREATH ALCOHOL COMMENT: NORMAL
BZO BENZODIAZEPINES: NORMAL
COC COCAINE: NORMAL
INT CON NEG: NORMAL
INT CON POS: NORMAL
MDMA ECSTASY: NORMAL
MET METHAMPHETAMINES: NORMAL
MTD METHADONE: NORMAL
OPI OPIATES: NORMAL
OXY OXYCODONE: NORMAL
PCP PHENCYCLIDINE: NORMAL
POC BREATHALIZER: 0 PERCENT (ref 0–0.01)
POC URINE DRUG SCREEN OCDRS: NEGATIVE
THC: NORMAL

## 2025-02-05 NOTE — DISCHARGE INSTRUCTIONS
Please follow-up with [facility] occupational health by calling the phone number provided.    You will be contacted if needed for any initiation of antivirals.
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
